# Patient Record
Sex: MALE | Race: WHITE | NOT HISPANIC OR LATINO | Employment: FULL TIME | URBAN - METROPOLITAN AREA
[De-identification: names, ages, dates, MRNs, and addresses within clinical notes are randomized per-mention and may not be internally consistent; named-entity substitution may affect disease eponyms.]

---

## 2021-06-18 ENCOUNTER — HISTORICAL (OUTPATIENT)
Dept: RADIOLOGY | Facility: HOSPITAL | Age: 46
End: 2021-06-18

## 2021-08-10 ENCOUNTER — HISTORICAL (OUTPATIENT)
Dept: FAMILY MEDICINE | Facility: CLINIC | Age: 46
End: 2021-08-10

## 2021-08-10 LAB
ABS NEUT (OLG): 5.8 X10(3)/MCL (ref 2.1–9.2)
ALBUMIN SERPL-MCNC: 3.4 GM/DL (ref 3.5–5)
ALBUMIN/GLOB SERPL: 0.8 RATIO (ref 1.1–2)
ALP SERPL-CCNC: 92 UNIT/L (ref 40–150)
ALT SERPL-CCNC: 59 UNIT/L (ref 0–55)
AST SERPL-CCNC: 29 UNIT/L (ref 5–34)
BASOPHILS # BLD AUTO: 0.1 X10(3)/MCL (ref 0–0.2)
BASOPHILS NFR BLD AUTO: 1 %
BILIRUB SERPL-MCNC: 0.4 MG/DL
BILIRUBIN DIRECT+TOT PNL SERPL-MCNC: 0.2 MG/DL (ref 0–0.5)
BILIRUBIN DIRECT+TOT PNL SERPL-MCNC: 0.2 MG/DL (ref 0–0.8)
BUN SERPL-MCNC: 10.4 MG/DL (ref 8.9–20.6)
CALCIUM SERPL-MCNC: 9.5 MG/DL (ref 8.4–10.2)
CHLORIDE SERPL-SCNC: 102 MMOL/L (ref 98–107)
CHOLEST SERPL-MCNC: 200 MG/DL
CHOLEST/HDLC SERPL: 7 {RATIO} (ref 0–5)
CO2 SERPL-SCNC: 27 MMOL/L (ref 22–29)
CREAT SERPL-MCNC: 1 MG/DL (ref 0.73–1.18)
EOSINOPHIL # BLD AUTO: 0.3 X10(3)/MCL (ref 0–0.9)
EOSINOPHIL NFR BLD AUTO: 3 %
ERYTHROCYTE [DISTWIDTH] IN BLOOD BY AUTOMATED COUNT: 12.5 % (ref 11.5–14.5)
EST. AVERAGE GLUCOSE BLD GHB EST-MCNC: 159.9 MG/DL
GLOBULIN SER-MCNC: 4.1 GM/DL (ref 2.4–3.5)
GLUCOSE SERPL-MCNC: 227 MG/DL (ref 74–100)
HBA1C MFR BLD: 7.2 %
HCT VFR BLD AUTO: 42.3 % (ref 40–51)
HDLC SERPL-MCNC: 30 MG/DL (ref 35–60)
HGB BLD-MCNC: 14 GM/DL (ref 13.5–17.5)
IMM GRANULOCYTES # BLD AUTO: 0.04 10*3/UL
IMM GRANULOCYTES NFR BLD AUTO: 0 %
LDLC SERPL CALC-MCNC: 121 MG/DL (ref 50–140)
LYMPHOCYTES # BLD AUTO: 2.9 X10(3)/MCL (ref 0.6–4.6)
LYMPHOCYTES NFR BLD AUTO: 30 %
MCH RBC QN AUTO: 29.7 PG (ref 26–34)
MCHC RBC AUTO-ENTMCNC: 33.1 GM/DL (ref 31–37)
MCV RBC AUTO: 89.6 FL (ref 80–100)
MONOCYTES # BLD AUTO: 0.6 X10(3)/MCL (ref 0.1–1.3)
MONOCYTES NFR BLD AUTO: 6 %
NEUTROPHILS # BLD AUTO: 5.8 X10(3)/MCL (ref 2.1–9.2)
NEUTROPHILS NFR BLD AUTO: 60 %
NRBC BLD AUTO-RTO: 0 % (ref 0–0.2)
PLATELET # BLD AUTO: 259 X10(3)/MCL (ref 130–400)
PMV BLD AUTO: 8.3 FL (ref 7.4–10.4)
POTASSIUM SERPL-SCNC: 4 MMOL/L (ref 3.5–5.1)
PROT SERPL-MCNC: 7.5 GM/DL (ref 6.4–8.3)
RBC # BLD AUTO: 4.72 X10(6)/MCL (ref 4.5–5.9)
SODIUM SERPL-SCNC: 138 MMOL/L (ref 136–145)
TRIGL SERPL-MCNC: 245 MG/DL (ref 34–140)
TSH SERPL-ACNC: 2.38 UIU/ML (ref 0.35–4.94)
VLDLC SERPL CALC-MCNC: 49 MG/DL
WBC # SPEC AUTO: 9.7 X10(3)/MCL (ref 4.5–11)

## 2021-09-15 ENCOUNTER — HISTORICAL (OUTPATIENT)
Dept: ADMINISTRATIVE | Facility: HOSPITAL | Age: 46
End: 2021-09-15

## 2021-09-15 LAB
CREAT UR-MCNC: 303.1 MG/DL (ref 58–161)
MICROALBUMIN UR-MCNC: 20 MG/L
MICROALBUMIN/CREAT RATIO PNL UR: 6.6 MG/GM CR (ref 0–30)

## 2021-09-26 LAB
LEFT EYE DM RETINOPATHY: NEGATIVE
RIGHT EYE DM RETINOPATHY: NEGATIVE

## 2021-11-17 ENCOUNTER — HISTORICAL (OUTPATIENT)
Dept: ADMINISTRATIVE | Facility: HOSPITAL | Age: 46
End: 2021-11-17

## 2021-11-17 LAB
ALBUMIN SERPL-MCNC: 3.7 GM/DL (ref 3.5–5)
ALBUMIN/GLOB SERPL: 0.9 RATIO (ref 1.1–2)
ALP SERPL-CCNC: 89 UNIT/L (ref 40–150)
ALT SERPL-CCNC: 49 UNIT/L (ref 0–55)
AST SERPL-CCNC: 31 UNIT/L (ref 5–34)
BILIRUB SERPL-MCNC: 0.5 MG/DL
BILIRUBIN DIRECT+TOT PNL SERPL-MCNC: 0.2 MG/DL (ref 0–0.5)
BILIRUBIN DIRECT+TOT PNL SERPL-MCNC: 0.3 MG/DL (ref 0–0.8)
BUN SERPL-MCNC: 11.3 MG/DL (ref 8.9–20.6)
CALCIUM SERPL-MCNC: 9.5 MG/DL (ref 8.7–10.5)
CHLORIDE SERPL-SCNC: 102 MMOL/L (ref 98–107)
CO2 SERPL-SCNC: 28 MMOL/L (ref 22–29)
CREAT SERPL-MCNC: 0.89 MG/DL (ref 0.73–1.18)
EST. AVERAGE GLUCOSE BLD GHB EST-MCNC: 137 MG/DL
GLOBULIN SER-MCNC: 4.2 GM/DL (ref 2.4–3.5)
GLUCOSE SERPL-MCNC: 120 MG/DL (ref 74–100)
HBA1C MFR BLD: 6.4 %
HCV AB SERPL QL IA: NONREACTIVE
HIV 1+2 AB+HIV1 P24 AG SERPL QL IA: NONREACTIVE
POTASSIUM SERPL-SCNC: 3.8 MMOL/L (ref 3.5–5.1)
PROT SERPL-MCNC: 7.9 GM/DL (ref 6.4–8.3)
SODIUM SERPL-SCNC: 137 MMOL/L (ref 136–145)

## 2022-01-10 ENCOUNTER — HISTORICAL (OUTPATIENT)
Dept: LAB | Facility: HOSPITAL | Age: 47
End: 2022-01-10

## 2022-01-10 LAB — SARS-COV-2 RNA RESP QL NAA+PROBE: DETECTED

## 2022-04-10 ENCOUNTER — HISTORICAL (OUTPATIENT)
Dept: ADMINISTRATIVE | Facility: HOSPITAL | Age: 47
End: 2022-04-10
Payer: COMMERCIAL

## 2022-04-25 VITALS
OXYGEN SATURATION: 99 % | BODY MASS INDEX: 42.66 KG/M2 | SYSTOLIC BLOOD PRESSURE: 128 MMHG | WEIGHT: 315 LBS | HEIGHT: 72 IN | DIASTOLIC BLOOD PRESSURE: 77 MMHG

## 2022-05-03 NOTE — HISTORICAL [facility] CERNER
This is a historical note converted from Antony. Formatting and pictures may have been removed.  Please reference Antony for original formatting and attached multimedia. Chief Complaint  F/U visit DM Type 2.  History of Present Illness  Ashish presents today for follow-up?after recent interventions?for his diabetes mellitus/?elevated BMI?and OA knees and?chronic low back pain.? At our last visit,?we started Metformin.? He also has made significant efforts at calorie counting?and increasing his physical activity.? He has lost 7 pounds since his last visit?and I congratulated him on this accomplishment.? He continues to be motivated?to change his?lifestyle habits.? He did eat?more calories than his allotment?one time when he went to eat at [employer].? Denies any side effects to Metformin.  He is due for urine microalbumin creatinine ratio and a?fundus photo.  ?  CONCEPCIÓN/serous otitis media/chronic nasal congestion:?He was referred to see Dr. Rodas?and was found to have a intranasal ulcer and was prescribed doxycycline. ?He is currently still taking the doxycycline.? They also recommended a repeat?sleep study?as it has been more than 5 years since his last evaluation.? He will try to complete the home sleep study this weekend.  OA bilateral knees/chronic low back pain:?He has not yet?pursued aquatic therapy?due to concerns re: COVID-19.? I encouraged him today?since the COVID-19 numbers?seem to be in decline, to investigate?the possibility of doing?aquatic therapy at?[facility] or?[facility].  Review of Systems  See HPI  Physical Exam  Vitals & Measurements  T:?36.6? ?C (Oral)? HR:?67(Peripheral)? RR:?18? BP:?115/79? SpO2:?94%?  HT:?182.00?cm? WT:?166.800?kg? BMI:?50.36?  General: Calm; cooperative; pleasant  PSYCH:?appropriate mood and affect  SKIN: Bilateral lower extremity postinflammatory hyperpigmentation due to prior?mosquito bites  RESP: speaking in complete sentences without obvious respiratory distress  NEURO:?alert  and oriented  DFE:?Documented in ad hoc?charting  Assessment/Plan  1.?Diabetes mellitus, type 2?E11.9  Diabetic foot exam completed  Urine microalbumin ordered  Referred for fundus photo  CMP, hemoglobin A1c ordered for next visit  Metformin refilled  Ordered:  Microalbum/Creatinine Ratio Urine (Microalb/Creat), Routine collect, Urine, 09/15/21 11:34:00 CDT, Stop date 09/15/21 11:34:00 CDT, Nurse collect, Diabetes mellitus, type 2  ?  2.?CONCEPCIÓN on CPAP?G47.33  ?Follow-up with Dr. Rodas per his direction  Ordered:  ?  3.?Osteoarthritis of both knees?M17.0  ?Encouraged to pursue?aquatic therapy  Ordered:  ?  4.?Morbid obesity?E66.01  ?Continued encouragement and efforts at?weight loss,?healthy eating?and exercise  Ordered:  ?  Orders:  metFORMIN, See Instructions, TAKE 1 TABLET BY MOUTH TWICE A DAY, # 60 tab(s), 1 Refill(s), Pharmacy: [facility]/pharmacy #[id number], 182, cm, Height/Length Dosing, 09/15/21 10:32:00 CDT, 166.8, kg, Weight Dosing, 09/15/21 10:32:00 CDT  Referrals  [facility] Internal Referral to [facility], Specialty: Ophthalmology, Start: 09/15/21 11:29:00 CDT, Service By: 12/31/21, Urgent, Exclusions: No  Clinic Follow up, *Est. 11/17/21 9:30:00 CST, Order for future visit, Diabetes mellitus, type 2, [facility]   Problem List/Past Medical History  Ongoing  Allergic rhinitis, unspecified  Anxiety  Diabetes mellitus, type 2  Morbid obesity  CONCEPCIÓN on CPAP  Osteoarthritis of both knees  Historical  Closed fracture of multiple right ribs  Closed fracture of right foot  Closed fracture of the radial shaft  Fractured nasal bones  Procedure/Surgical History  Closed reduction of fracture of radius and ulna  denies  Open reduction of fracture of metacarpal bone with internal fixation   Medications  cholecalciferol 5000 intl units (125 mcg) oral capsule, 5000 IntUnit= 1 cap(s), Oral, qAM  clonazePAM 0.5 mg oral tablet, 0.5 mg= 1 tab(s), Oral, TID, PRN  doxycycline hyclate 100 mg oral capsule, 100 mg= 1 cap(s),  Oral, BID  FLUoxetine 40 mg oral capsule, 40 mg= 1 cap(s), Oral, qAM  metformin 500 mg oral tablet, See Instructions, 1 refills  mometasone 50 mcg/inh nasal spray, 2 spray(s), Nasal, Daily, 2 refills  triamcinolone 0.1% topical ointment, TOP, TID  Zyrtec 10 mg oral tablet, 10 mg= 1 tab(s), Oral, Daily  Allergies  Adhesive Bandage?(Rash)  mixed grass pollens allergen extract?(Hives)  Social History  Abuse/Neglect  No, No, Yes, 09/15/2021  Alcohol  Past, 05/26/2021  Employment/School  Employed, 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 Living situation: Home/Independent. CPAP/BiPAP, 05/26/2021    Never in , 05/26/2021  Nutrition/Health  Regular, Good, 05/26/2021  Sexual  Sexually active: No. Sexual orientation: Straight or heterosexual. Gender Identity Identifies as male. No, 05/26/2021  Spiritual/Cultural  Congregation, No, 05/26/2021  Substance Use  Past, Marijuana, 05/26/2021  Tobacco  Never (less than 100 in lifetime), N/A, 09/15/2021  Immunizations  Vaccine Date Status   COVID-19, vector nr, rS-Ad26 - Virginie 03/05/2021 Given       Patient had an elevated ALT of 59 at last assessment.? We will also order acute hep panel?as hepatitis C screening is recommended for all?patients 18-79 and because of his ALT?we will also assess?hep B and?have a status

## 2022-05-03 NOTE — HISTORICAL [facility] CERNER
This is a historical note converted from Antony. Formatting and pictures may have been removed.  Please reference Antony for original formatting and attached multimedia. Chief Complaint  Routine clinic F/U. No c/o voiced.  History of Present Illness  Kaiser is a 45-year-old established patient with a history of diabetes mellitus type 2,?struct of sleep apnea on CPAP,?allergic rhinitis, serous otitis media?and elevated BMI who presents for follow-up of his chronic medical conditions.  ?  Diabetes mellitus type 2/elevated BMI:?Kaiser has adopted?healthier eating habits?and has been controlling his portion size and making better choices in regard to his meals.??He is compliant with his Metformin on most days but admits to missing a dose approximately once weekly.??He has not been able to exercise?or?improve his diet further since our last visit due to?some changes at work.??He remains motivated?and despite?a busy schedule has lost?a couple of?additional pounds.??Last hemoglobin A1c was 7.1%.??He is due for hemoglobin A1c today.  ?  Allergic rhinitis/serous otitis media:?He continues?to be followed by?Dr. Rodas?though his symptoms have improved.??He continues to use?mometasone nasal spray?and cetirizine.  ?  ?  CONCEPCIÓN:?He is compliant with his CPAP nightly.??He attempted to do a sleep study?to determine if he needed retitration?but he was unable to sleep sufficiently?to evaluate?the efficiency of his sleep.  ?  Multiple skin lesions:?He has a lesion on?his left middle finger,?his left?thigh and his left lateral leg.??Images?are?present and capture for review.  ?  ?  Elevated?ALT: ?We will repeat CMP today?and order hepatitis C?antibody screening  ?  Health maintenance:?We will order HIV?screening?in this patient without risk factors.  Review of Systems  See HPI above  Physical Exam  Vitals & Measurements  T:?36.8? ?C (Oral)? HR:?63(Peripheral)? RR:?18? BP:?128/77? SpO2:?99%?  HT:?182.00?cm? WT:?165.200?kg?  BMI:?49.87?  Alert oriented x4 no acute distress  CVS:?Regular rate and rhythm without gallops rubs  Diabetic foot exam:?Palpable dorsalis pedis?and posterior tibial pulses; sensation intact?throughout.??No deformity?or significant calluses noted.  Skin:?Multiple?papules on his scalp?likely due to recent?shaving of his head.??He also has a papule?near his temple which he?relates to the pressure from his CPAP mask.??  Assessment/Plan  1.?Diabetes mellitus, type 2?E11.9  ?CMP, hemoglobin A1c today.??PPSV23 today?due to history of diabetes.??He defers?Tdap and?declines influenza?due to the fact that he usually feels ill after taking?influenza vaccinations..??We will consider Tdap?at next visit or he may?receive it at his local pharmacy.??I also counseled him on?recommendation for COVID-19 booster?based on his comorbidities.??Todays hemoglobin A1c is 6.4% improved from?7.1%.  Ordered:  ?  2.?Morbid obesity?E66.01  ?Counseled regarding?continued efforts at portion control,?reduction in processed foods?and eating?more?whole foods.  Ordered:  ?  3.?Allergic rhinitis, unspecified?J30.9  ?Mometasone nasal spray/Zyrtec as needed  Ordered:  ?  4.?CONCEPCIÓN on CPAP?G47.33  ?Continued use of CPAP?nightly.  Ordered:  ?  Referrals  Clinic Follow up, *Est. 02/16/22 8:45:00 CST, Order for future visit, Diabetes mellitus, type 2, [facility]  5.??Elevated ALT:?Prior ALT was 59 todays repeat is 49?indicating improvement.   Problem List/Past Medical History  Ongoing  Allergic rhinitis, unspecified  Anxiety  Diabetes mellitus, type 2  Morbid obesity  CONCEPCIÓN on CPAP  Osteoarthritis of both knees  Historical  Closed fracture of multiple right ribs  Closed fracture of right foot  Closed fracture of the radial shaft  Fractured nasal bones  Procedure/Surgical History  Closed reduction of fracture of radius and ulna  denies  Open reduction of fracture of metacarpal bone with internal fixation   Medications  cholecalciferol 5000 intl units (125  mcg) oral capsule, 5000 IntUnit= 1 cap(s), Oral, qAM  clonazePAM 0.5 mg oral tablet, 0.5 mg= 1 tab(s), Oral, TID, PRN  FLUoxetine 40 mg oral capsule, 40 mg= 1 cap(s), Oral, qAM  metformin 500 mg oral tablet, See Instructions  mometasone 50 mcg/inh nasal spray, 2 spray(s), Nasal, Daily, 2 refills  triamcinolone 0.1% topical ointment, TOP, TID  Zyrtec 10 mg oral tablet, 10 mg= 1 tab(s), Oral, Daily  Allergies  Adhesive Bandage?(Rash)  mixed grass pollens allergen extract?(Hives)  Social History  Abuse/Neglect  No, No, Yes, 11/17/2021  Alcohol  Past, 05/26/2021  Employment/School  Employed, 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 Living situation: Home/Independent. CPAP/BiPAP, 05/26/2021    Never in , 05/26/2021  Nutrition/Health  Regular, Good, 05/26/2021  Sexual  Sexually active: No. Sexual orientation: Straight or heterosexual. Gender Identity Identifies as male. No, 05/26/2021  Spiritual/Cultural  [employer], No, 05/26/2021  Substance Use  Past, Marijuana, 05/26/2021  Tobacco  Never (less than 100 in lifetime), N/A, 11/17/2021  Immunizations  Vaccine Date Status   pneumococcal 23-polyvalent vaccine 11/17/2021 Given   COVID-19, vector nr, rS-Ad26 - Virginie 03/05/2021 Given   Lab Results  Hemoglobin A1c 6.4%?ALT 49?both improved

## 2022-05-04 PROBLEM — E11.9 TYPE 2 DIABETES MELLITUS: Status: ACTIVE | Noted: 2022-05-04

## 2022-05-04 PROBLEM — E66.01 MORBID OBESITY: Status: ACTIVE | Noted: 2022-05-04

## 2022-05-04 PROBLEM — F41.1 GENERALIZED ANXIETY DISORDER: Status: ACTIVE | Noted: 2022-05-04

## 2022-05-04 PROBLEM — J30.9 ALLERGIC RHINITIS: Status: ACTIVE | Noted: 2022-05-04

## 2022-05-04 PROBLEM — G47.33 OBSTRUCTIVE SLEEP APNEA SYNDROME: Status: ACTIVE | Noted: 2022-05-04

## 2022-05-04 PROBLEM — M17.0 OSTEOARTHRITIS OF BOTH KNEES: Status: ACTIVE | Noted: 2022-05-04

## 2022-05-04 RX ORDER — TRIAMCINOLONE ACETONIDE 1 MG/G
1 OINTMENT TOPICAL 3 TIMES DAILY PRN
COMMUNITY
Start: 2021-08-16

## 2022-05-04 RX ORDER — METFORMIN HYDROCHLORIDE 500 MG/1
500 TABLET ORAL 2 TIMES DAILY WITH MEALS
Qty: 180 TABLET | Refills: 3 | Status: SHIPPED | OUTPATIENT
Start: 2022-05-04 | End: 2022-06-13 | Stop reason: SDUPTHER

## 2022-05-04 RX ORDER — CLONAZEPAM 0.5 MG/1
0.5 TABLET ORAL 3 TIMES DAILY PRN
COMMUNITY
Start: 2021-05-26

## 2022-05-04 RX ORDER — FLUOXETINE HYDROCHLORIDE 40 MG/1
40 CAPSULE ORAL EVERY MORNING
COMMUNITY
Start: 2022-01-03 | End: 2022-08-17 | Stop reason: ALTCHOICE

## 2022-05-04 RX ORDER — CHOLECALCIFEROL (VITAMIN D3) 125 MCG
5000 CAPSULE ORAL EVERY MORNING
COMMUNITY
Start: 2022-01-03 | End: 2022-09-21 | Stop reason: SDUPTHER

## 2022-05-04 RX ORDER — METFORMIN HYDROCHLORIDE 500 MG/1
500 TABLET ORAL 2 TIMES DAILY
COMMUNITY
Start: 2021-11-22 | End: 2022-05-04 | Stop reason: SDUPTHER

## 2022-05-18 ENCOUNTER — OFFICE VISIT (OUTPATIENT)
Dept: FAMILY MEDICINE | Facility: CLINIC | Age: 47
End: 2022-05-18
Payer: COMMERCIAL

## 2022-05-18 VITALS
BODY MASS INDEX: 42.66 KG/M2 | HEART RATE: 82 BPM | OXYGEN SATURATION: 95 % | TEMPERATURE: 99 F | SYSTOLIC BLOOD PRESSURE: 124 MMHG | WEIGHT: 315 LBS | HEIGHT: 72 IN | DIASTOLIC BLOOD PRESSURE: 84 MMHG

## 2022-05-18 VITALS
BODY MASS INDEX: 42.66 KG/M2 | OXYGEN SATURATION: 97 % | WEIGHT: 315 LBS | SYSTOLIC BLOOD PRESSURE: 121 MMHG | TEMPERATURE: 98 F | HEART RATE: 60 BPM | RESPIRATION RATE: 18 BRPM | DIASTOLIC BLOOD PRESSURE: 79 MMHG | HEIGHT: 72 IN

## 2022-05-18 DIAGNOSIS — E11.9 TYPE 2 DIABETES MELLITUS WITHOUT COMPLICATION, WITHOUT LONG-TERM CURRENT USE OF INSULIN: Primary | ICD-10-CM

## 2022-05-18 DIAGNOSIS — D23.9 DERMATOFIBROMA: Primary | ICD-10-CM

## 2022-05-18 PROCEDURE — 99213 OFFICE O/P EST LOW 20 MIN: CPT | Mod: PBBFAC | Performed by: FAMILY MEDICINE

## 2022-05-18 PROCEDURE — 17110 DESTRUCTION B9 LES UP TO 14: CPT | Mod: PBBFAC | Performed by: STUDENT IN AN ORGANIZED HEALTH CARE EDUCATION/TRAINING PROGRAM

## 2022-05-18 PROCEDURE — 99214 OFFICE O/P EST MOD 30 MIN: CPT | Mod: PBBFAC,27 | Performed by: FAMILY MEDICINE

## 2022-05-18 NOTE — PROGRESS NOTES
Chief Complaint  Follow-up (Growth to left ring finger x 1 year)      History of Present Illness    47 yo M returns to minor surgery for cryotherapy of fourth finger of left hand.      Lesion on his finger that was treated with cryo did improve. A surface of the lesion came off and has a smoother surface. No problems with cryo. Pt would like to keep treating it with cryo at this time.  He also has history of dermatofibroma of left thigh confirmed by pathology after punch biopsy.     Final Diagnosis (Verified)    Skin, left thigh, punch biopsy:  - Dermatofibroma. See comment    Review of Systems  ROS    Physical Exam  Physical Exam    Vitals:    05/18/22 0844   BP: 124/84   Pulse: 82   Temp: 99.1 °F (37.3 °C)     Wt Readings from Last 2 Encounters:   05/18/22 (!) 164.6 kg (362 lb 14 oz)   11/17/21 (!) 165.2 kg (364 lb 3.2 oz)     Gen: alert, no acute distress  Skin: left thigh- incision clean, dry,and healed.  No surrounding erythema, warmth, or drainage. Left fourth finger with raised area noted - not as raised as previous visit but lateral aspect of lesion still mildly raised.   Psych: cooperative, appropriate mood and affect    Procedure Note:  Procedure: cryosurgery for dermatofibroma  Performed by: Lalitha Freed MD;  Sudheer Stout DO  Consent: signed consent obtained after discussion of risks, benefits, and alternative treatments  Description: Liquid nitrogen used for cryotherapy. Tip held 1 cm from lesion and sprayed in freeze-thaw cycle.   The patient tolerated the procedure well with no complications.           Assessment / Plan:    Dermatofibroma  -Cryotherapy to fourth finger  -Patient tolerated procedure well  -Post care instructions and return precautions discussed.        Follow up:    -Follow up PRN

## 2022-05-18 NOTE — PROGRESS NOTES
Subjective:       Patient ID: Kaiser Herron Jr. is a 46 y.o. male.    Chief Complaint: Diabetes    HPI   46-year-old established patient with history of diabetes mellitus, elevated BMI, CONCEPCIÓN on CPAP, severe allergic rhinitis and tinnitus presents for follow-up of his chronic medical conditions. He also had COVID-19 in January and has no residual symptoms.       Diabetes mellitus: Well-controlled at last assessment with A1c of 6.4% (11/17/2021) on Metformin. Patient is working on a healthier diet and consuming between 1500 to 1800 lizbeth daily and increasing his physical activity.  Elevated BMI: Maximum BMI was 51.5 on June 28, 2021. With alteration of diet and increased activity current BMI is 49.71. Overall 13 pound reduction in weight.  We previously  discussed using a GLP-1 to assist in his weight reduction but will continue defer until his labs are reassessed.   Tinnitus/severe allergic rhinitis: Patient was seen by ENT and scheduled for MRI to evaluate a for 8th nerve in Jan. MRI was canceled due to his COVID-19 infection. He continues to have tinnitus and will be rescheduling MRI. Patient will have it done later this year after he has met his deductible.   CONCEPCIÓN: Compliant with  CPAP and wakes feeling well rested.   Review of Systems   Constitutional: Negative for activity change, chills, diaphoresis and fever.   HENT: Positive for nasal congestion (intermittent ) and tinnitus.    Respiratory: Negative for cough and wheezing.    Endocrine: Negative for polydipsia, polyphagia and polyuria.            Objective:      Vitals:    05/18/22 1116   BP: 121/79   Pulse: 60   Resp: 18   Temp: 98.4 °F (36.9 °C)       Physical Exam  Constitutional:       General: He is not in acute distress.     Appearance: He is obese.   Cardiovascular:      Rate and Rhythm: Normal rate and regular rhythm.      Heart sounds: No murmur heard.    No friction rub. No gallop.   Pulmonary:      Effort: Pulmonary effort is normal. No respiratory  distress.      Breath sounds: Normal breath sounds. No wheezing.   Neurological:      Mental Status: He is alert.           Assessment/Plan:  Type 2 diabetes mellitus without complication, without long-term current use of insulin  -     Comprehensive Metabolic Panel  -     Hemoglobin A1C  -     Lipid Panel  -     Microalbumin/Creatinine Ratio, Urine  -     TSH    We contacted [facility] to find out why metformin was not filled. They are investigating. Patient will do labs next week      Follow up in about 3 months (around 8/18/2022).

## 2022-06-13 ENCOUNTER — TELEPHONE (OUTPATIENT)
Dept: FAMILY MEDICINE | Facility: CLINIC | Age: 47
End: 2022-06-13
Payer: COMMERCIAL

## 2022-06-13 ENCOUNTER — APPOINTMENT (OUTPATIENT)
Dept: LAB | Facility: HOSPITAL | Age: 47
End: 2022-06-13
Attending: FAMILY MEDICINE
Payer: COMMERCIAL

## 2022-06-13 DIAGNOSIS — E11.9 TYPE 2 DIABETES MELLITUS WITHOUT COMPLICATION, WITHOUT LONG-TERM CURRENT USE OF INSULIN: Primary | ICD-10-CM

## 2022-06-13 DIAGNOSIS — E66.01 MORBID OBESITY: ICD-10-CM

## 2022-06-13 LAB
ALBUMIN SERPL-MCNC: 3.5 GM/DL (ref 3.5–5)
ALBUMIN/GLOB SERPL: 0.9 RATIO (ref 1.1–2)
ALP SERPL-CCNC: 87 UNIT/L (ref 40–150)
ALT SERPL-CCNC: 62 UNIT/L (ref 0–55)
AST SERPL-CCNC: 32 UNIT/L (ref 5–34)
BILIRUBIN DIRECT+TOT PNL SERPL-MCNC: 0.3 MG/DL
BUN SERPL-MCNC: 11.7 MG/DL (ref 8.9–20.6)
CALCIUM SERPL-MCNC: 9.7 MG/DL (ref 8.4–10.2)
CHLORIDE SERPL-SCNC: 102 MMOL/L (ref 98–107)
CHOLEST SERPL-MCNC: 200 MG/DL
CHOLEST/HDLC SERPL: 5 {RATIO} (ref 0–5)
CO2 SERPL-SCNC: 27 MMOL/L (ref 22–29)
CREAT SERPL-MCNC: 0.96 MG/DL (ref 0.73–1.18)
CREAT UR-MCNC: 137.5 MG/DL (ref 63–166)
EST. AVERAGE GLUCOSE BLD GHB EST-MCNC: 165.7 MG/DL
GLOBULIN SER-MCNC: 4.1 GM/DL (ref 2.4–3.5)
GLUCOSE SERPL-MCNC: 173 MG/DL (ref 74–100)
HBA1C MFR BLD: 7.4 %
HDLC SERPL-MCNC: 37 MG/DL (ref 35–60)
LDLC SERPL CALC-MCNC: 141 MG/DL (ref 50–140)
MICROALBUMIN UR-MCNC: 18 UG/ML
MICROALBUMIN/CREAT RATIO PNL UR: 13.1 MG/GM CR (ref 0–30)
POTASSIUM SERPL-SCNC: 4.2 MMOL/L (ref 3.5–5.1)
PROT SERPL-MCNC: 7.6 GM/DL (ref 6.4–8.3)
SODIUM SERPL-SCNC: 138 MMOL/L (ref 136–145)
TRIGL SERPL-MCNC: 112 MG/DL (ref 34–140)
TSH SERPL-ACNC: 2.74 UIU/ML (ref 0.35–4.94)
VLDLC SERPL CALC-MCNC: 22 MG/DL

## 2022-06-13 PROCEDURE — 80053 COMPREHEN METABOLIC PANEL: CPT | Performed by: FAMILY MEDICINE

## 2022-06-13 PROCEDURE — 36415 COLL VENOUS BLD VENIPUNCTURE: CPT | Performed by: FAMILY MEDICINE

## 2022-06-13 PROCEDURE — 83036 HEMOGLOBIN GLYCOSYLATED A1C: CPT | Performed by: FAMILY MEDICINE

## 2022-06-13 PROCEDURE — 80061 LIPID PANEL: CPT | Performed by: FAMILY MEDICINE

## 2022-06-13 PROCEDURE — 84443 ASSAY THYROID STIM HORMONE: CPT | Performed by: FAMILY MEDICINE

## 2022-06-13 PROCEDURE — 82043 UR ALBUMIN QUANTITATIVE: CPT | Performed by: FAMILY MEDICINE

## 2022-06-13 RX ORDER — METFORMIN HYDROCHLORIDE 500 MG/1
1000 TABLET ORAL 2 TIMES DAILY WITH MEALS
Qty: 360 TABLET | Refills: 3 | Status: SHIPPED | OUTPATIENT
Start: 2022-06-13 | End: 2023-04-26

## 2022-06-13 RX ORDER — DULAGLUTIDE 0.75 MG/.5ML
0.75 INJECTION, SOLUTION SUBCUTANEOUS
Qty: 4 PEN | Refills: 2 | Status: SHIPPED | OUTPATIENT
Start: 2022-06-13 | End: 2022-08-17

## 2022-06-13 NOTE — TELEPHONE ENCOUNTER
Hemoglobin A1c 7.4 (6.4 previously)  Estimated average glucose 165.7  Total cholesterol 200  HDL 37 (30)  Triglycerides 112 (245)   (121)  Glucose 173 (120)  Microalbumin creatinine ratio 13.1 (6.6)  ALT 62 (49, 59)    Above results reviewed in great detail with patient.  He reports he had difficulty getting his metformin fill because he did not pick it up when it was initially filled and then they reach out it but marked has picked up.  This created gap in his care where he did not get his metformin for at least 4-5 weeks.  He has been resumed it approximately 1 week ago.  Given his increased A1c at 7.4%, BMI 49.71 and elevated LDL, he has characteristics of metabolic syndrome.  I recommended he increase the metformin to 1000 mg twice daily and a new prescription was sent to the pharmacy today.  I would also like to add Trulicity 0.75 weekly for glucose control and weight loss.

## 2022-07-12 ENCOUNTER — TELEPHONE (OUTPATIENT)
Dept: FAMILY MEDICINE | Facility: CLINIC | Age: 47
End: 2022-07-12
Payer: COMMERCIAL

## 2022-07-12 DIAGNOSIS — E11.9 TYPE 2 DIABETES MELLITUS WITHOUT COMPLICATION, WITHOUT LONG-TERM CURRENT USE OF INSULIN: Primary | ICD-10-CM

## 2022-07-12 DIAGNOSIS — E78.2 HYPERLIPIDEMIA, MIXED: ICD-10-CM

## 2022-07-12 RX ORDER — ROSUVASTATIN CALCIUM 20 MG/1
20 TABLET, COATED ORAL DAILY
Qty: 90 TABLET | Refills: 3 | Status: SHIPPED | OUTPATIENT
Start: 2022-07-12 | End: 2022-08-17 | Stop reason: SDUPTHER

## 2022-07-12 NOTE — TELEPHONE ENCOUNTER
I received notice from [facility] referencing Statin Therapy   Total Chol 200  HDL 37  TRIG 112     Will begin statin therapy   Moderate intensity statin therapy based on age/ DM   ACC/AHA 10 yr risk 4.6%   Patient notified of new script

## 2022-08-02 ENCOUNTER — PATIENT OUTREACH (OUTPATIENT)
Dept: FAMILY MEDICINE | Facility: CLINIC | Age: 47
End: 2022-08-02
Payer: COMMERCIAL

## 2022-08-17 ENCOUNTER — OFFICE VISIT (OUTPATIENT)
Dept: FAMILY MEDICINE | Facility: CLINIC | Age: 47
End: 2022-08-17
Payer: COMMERCIAL

## 2022-08-17 VITALS
DIASTOLIC BLOOD PRESSURE: 78 MMHG | HEIGHT: 71 IN | SYSTOLIC BLOOD PRESSURE: 117 MMHG | OXYGEN SATURATION: 96 % | RESPIRATION RATE: 18 BRPM | WEIGHT: 315 LBS | HEART RATE: 74 BPM | BODY MASS INDEX: 44.1 KG/M2 | TEMPERATURE: 98 F

## 2022-08-17 DIAGNOSIS — E66.01 MORBID OBESITY: ICD-10-CM

## 2022-08-17 DIAGNOSIS — E78.2 HYPERLIPIDEMIA, MIXED: ICD-10-CM

## 2022-08-17 DIAGNOSIS — E11.9 TYPE 2 DIABETES MELLITUS WITHOUT COMPLICATION, WITHOUT LONG-TERM CURRENT USE OF INSULIN: Primary | ICD-10-CM

## 2022-08-17 DIAGNOSIS — Z23 NEED FOR TD VACCINE: ICD-10-CM

## 2022-08-17 PROCEDURE — 99214 OFFICE O/P EST MOD 30 MIN: CPT | Mod: PBBFAC | Performed by: FAMILY MEDICINE

## 2022-08-17 RX ORDER — ROSUVASTATIN CALCIUM 20 MG/1
20 TABLET, COATED ORAL DAILY
Qty: 90 TABLET | Refills: 3 | Status: SHIPPED | OUTPATIENT
Start: 2022-08-17 | End: 2023-04-26 | Stop reason: SDUPTHER

## 2022-08-17 RX ORDER — DULOXETIN HYDROCHLORIDE 60 MG/1
60 CAPSULE, DELAYED RELEASE ORAL DAILY
COMMUNITY
End: 2024-02-28 | Stop reason: SDUPTHER

## 2022-08-17 NOTE — LETTER
August 17, 2022      [facility] [address] [facility] [address]  Phone: [phone]       Patient: Kaiser Herron   YOB: 1975  Date of Visit: 08/17/2022    To Whom It May Concern:    Best Herron  was at [facility] on 08/17/2022. The patient may return to work on 08/17/2022 without restrictions. If you have any questions or concerns, or if I can be of further assistance, please do not hesitate to contact me.    Sincerely,    Bailee Saab LPN

## 2022-08-17 NOTE — PROGRESS NOTES
"  Subjective:       Patient ID: Kaiser Herron Jr. is a 46 y.o. male.    Chief Complaint: Follow-up and Diabetes (3 mo f/u diabetes)    HPI  47 y/o with history of DMII, hyperlipidemia Class III obesity presents for follow up      Diabetes type 2 uncontrolled:  Recent A1c 7.4% June 13, 2022  Started on Trulicity 0.75 weekly/compliant with metformin 1000 b.i.d.  Weight has increased slightly  Diet and exercise inconsistent  Elevated LDL:  Based on ACC/aha risk calculation and history of diabetes recommendation for moderate intensity statin  Health maintenance:  Needs Tdap  Review of Systems   Constitutional: Positive for fatigue.   Cardiovascular: Negative for chest pain.   Endocrine: Positive for polydipsia. Negative for polyphagia and polyuria.   Integumentary:  Negative for pallor.   Neurological: Negative for dizziness, tremors, seizures, speech difficulty, weakness and headaches.   Psychiatric/Behavioral: Negative for confusion. The patient is not nervous/anxious.             Objective:      Vitals:    08/17/22 1024   BP: 117/78   Pulse: 74   Resp: 18   Temp: 98.2 °F (36.8 °C)   /78 (BP Location: Right arm, Patient Position: Sitting, BP Method: X-Large (Automatic))   Pulse 74   Temp 98.2 °F (36.8 °C) (Oral)   Resp 18   Ht 5' 11" (1.803 m)   Wt (!) 164.1 kg (361 lb 12.8 oz)   SpO2 96%   BMI 50.46 kg/m²       Physical Exam  Constitutional:       General: He is not in acute distress.     Appearance: He is obese. He is not toxic-appearing.   Cardiovascular:      Rate and Rhythm: Normal rate and regular rhythm.      Pulses: Normal pulses.      Heart sounds: Normal heart sounds.     No friction rub. No gallop.   Pulmonary:      Effort: Pulmonary effort is normal. No respiratory distress.      Breath sounds: Normal breath sounds. No wheezing or rales.   Musculoskeletal:      Right lower leg: No edema.      Left lower leg: No edema.   Neurological:      Mental Status: He is alert.   Psychiatric:         " Mood and Affect: Mood normal.         Thought Content: Thought content normal.         Judgment: Judgment normal.          Latest Reference Range & Units 06/13/22 09:27   Sodium 136 - 145 mmol/L 138   Potassium 3.5 - 5.1 mmol/L 4.2   Chloride 98 - 107 mmol/L 102   CO2 22 - 29 mmol/L 27   BUN 8.9 - 20.6 mg/dL 11.7   Creatinine 0.73 - 1.18 mg/dL 0.96   eGFR if non African American mls/min/1.73/m2 >60   Glucose 74 - 100 mg/dL 173 (H)   Calcium 8.4 - 10.2 mg/dL 9.7   Alkaline Phosphatase 40 - 150 unit/L 87   PROTEIN TOTAL 6.4 - 8.3 gm/dL 7.6   Albumin 3.5 - 5.0 gm/dL 3.5   Albumin/Globulin Ratio 1.1 - 2.0 ratio 0.9 (L)   BILIRUBIN TOTAL <=1.5 mg/dL 0.3   AST 5 - 34 unit/L 32   ALT 0 - 55 unit/L 62 (H)   Globulin, Total 2.4 - 3.5 gm/dL 4.1 (H)   Cholesterol <=200 mg/dL 200   HDL 35 - 60 mg/dL 37   LDL Cholesterol External 50.00 - 140.00 mg/dL 141.00 (H)   Total Cholesterol/HDL Ratio 0 - 5  5   Triglycerides 34 - 140 mg/dL 112   Very Low Density Lipoprotein  22   Thyroid Stimulating Hormone 0.3500 - 4.9400 uIU/mL 2.7366   Hemoglobin A1C External <=7.0 % 7.4 (H)   Estimated Avg Glucose mg/dL 165.7   Creatinine, Urine 63.0 - 166.0 mg/dL 137.5   Microalbumin, Urine <=30.0 ug/ml 18.0   MICROALB/CREAT RATIO 0.0 - 30.0 mg/gm Cr 13.1       Assessment/Plan:  Type 2 diabetes mellitus without complication, without long-term current use of insulin  -     increase dulaglutide (TRULICITY) 1.5 mg/0.5 mL pen injector; Inject 1.5 mg into the skin every 7 days.  Dispense: 4 pen; Refill: 2  -    Comprehensive Metabolic Panel, Hemoglobin A1C before next visit  -   due to increased cardiovascular risk, start rosuvastatin (CRESTOR) 20 MG tablet; Take 1 tablet (20 mg total) by mouth once daily.  Dispense: 90 tablet; Refill: 3    Morbid obesity  -     increase dulaglutide (TRULICITY) 1.5 mg/0.5 mL p as above   Hyperlipidemia, mixed  -     rosuvastatin (CRESTOR) 20 MG tablet; Take 1 tablet (20 mg total) by mouth once daily.  Dispense: 90 tablet;  Refill: 3    Need for Td vaccine  -     diptheria-tetanus toxoids 2-2 Lf unit/0.5 mL injection 0.5 mL     Follow up in about 4 weeks (around 9/14/2022).   Needs eye exam/foot exam at next visit

## 2022-09-06 DIAGNOSIS — J01.91 ACUTE RECURRENT SINUSITIS, UNSPECIFIED LOCATION: Primary | ICD-10-CM

## 2022-09-06 RX ORDER — AMOXICILLIN 875 MG/1
875 TABLET, FILM COATED ORAL EVERY 12 HOURS
Qty: 20 TABLET | Refills: 0 | Status: SHIPPED | OUTPATIENT
Start: 2022-09-06 | End: 2022-12-21 | Stop reason: ALTCHOICE

## 2022-09-06 NOTE — PROGRESS NOTES
Sinuses stuffed up, throat scratchy, sinus pressure . Blowing yellow, slightly bloody discharge. Symptoms began Saturday and patient has a history of recurrent sinusitis . He is requesting a prescription for antibiotic.  I explained that this could be viral upper respiratory infection and we usually wait 10 before treating such infections with antibiotics.  Due to the recurrent nature of his sinusitis and his history, I will prescribe amoxicillin and if his symptoms do not resolve he may begin it.  He also reported that his son was sick last week with similar symptoms.

## 2022-09-21 ENCOUNTER — CLINICAL SUPPORT (OUTPATIENT)
Dept: FAMILY MEDICINE | Facility: CLINIC | Age: 47
End: 2022-09-21
Attending: FAMILY MEDICINE
Payer: COMMERCIAL

## 2022-09-21 ENCOUNTER — OFFICE VISIT (OUTPATIENT)
Dept: FAMILY MEDICINE | Facility: CLINIC | Age: 47
End: 2022-09-21
Payer: COMMERCIAL

## 2022-09-21 ENCOUNTER — CLINICAL SUPPORT (OUTPATIENT)
Dept: FAMILY MEDICINE | Facility: CLINIC | Age: 47
End: 2022-09-21
Payer: COMMERCIAL

## 2022-09-21 VITALS
TEMPERATURE: 98 F | SYSTOLIC BLOOD PRESSURE: 109 MMHG | RESPIRATION RATE: 18 BRPM | OXYGEN SATURATION: 97 % | DIASTOLIC BLOOD PRESSURE: 74 MMHG | WEIGHT: 315 LBS | HEART RATE: 79 BPM | BODY MASS INDEX: 44.1 KG/M2 | HEIGHT: 71 IN

## 2022-09-21 DIAGNOSIS — E11.9 TYPE 2 DIABETES MELLITUS WITHOUT COMPLICATION, WITHOUT LONG-TERM CURRENT USE OF INSULIN: Primary | ICD-10-CM

## 2022-09-21 DIAGNOSIS — E11.9 TYPE 2 DIABETES MELLITUS WITHOUT COMPLICATION, WITHOUT LONG-TERM CURRENT USE OF INSULIN: ICD-10-CM

## 2022-09-21 DIAGNOSIS — E55.9 VITAMIN D DEFICIENCY: ICD-10-CM

## 2022-09-21 LAB
ALBUMIN SERPL-MCNC: 3.8 GM/DL (ref 3.5–5)
ALBUMIN/GLOB SERPL: 0.9 RATIO (ref 1.1–2)
ALP SERPL-CCNC: 84 UNIT/L (ref 40–150)
ALT SERPL-CCNC: 48 UNIT/L (ref 0–55)
AST SERPL-CCNC: 30 UNIT/L (ref 5–34)
BILIRUBIN DIRECT+TOT PNL SERPL-MCNC: 0.6 MG/DL
BUN SERPL-MCNC: 15.1 MG/DL (ref 8.9–20.6)
CALCIUM SERPL-MCNC: 9.6 MG/DL (ref 8.4–10.2)
CHLORIDE SERPL-SCNC: 100 MMOL/L (ref 98–107)
CO2 SERPL-SCNC: 27 MMOL/L (ref 22–29)
CREAT SERPL-MCNC: 0.89 MG/DL (ref 0.73–1.18)
EST. AVERAGE GLUCOSE BLD GHB EST-MCNC: 137 MG/DL
GFR SERPLBLD CREATININE-BSD FMLA CKD-EPI: >60 MLS/MIN/1.73/M2
GLOBULIN SER-MCNC: 4.4 GM/DL (ref 2.4–3.5)
GLUCOSE SERPL-MCNC: 138 MG/DL (ref 74–100)
HBA1C MFR BLD: 6.4 %
POTASSIUM SERPL-SCNC: 4 MMOL/L (ref 3.5–5.1)
PROT SERPL-MCNC: 8.2 GM/DL (ref 6.4–8.3)
SODIUM SERPL-SCNC: 137 MMOL/L (ref 136–145)

## 2022-09-21 PROCEDURE — 99214 OFFICE O/P EST MOD 30 MIN: CPT | Mod: PBBFAC | Performed by: FAMILY MEDICINE

## 2022-09-21 PROCEDURE — 83036 HEMOGLOBIN GLYCOSYLATED A1C: CPT | Performed by: FAMILY MEDICINE

## 2022-09-21 PROCEDURE — 80053 COMPREHEN METABOLIC PANEL: CPT | Performed by: FAMILY MEDICINE

## 2022-09-21 PROCEDURE — 36415 COLL VENOUS BLD VENIPUNCTURE: CPT | Performed by: FAMILY MEDICINE

## 2022-09-21 RX ORDER — CHOLECALCIFEROL (VITAMIN D3) 125 MCG
5000 CAPSULE ORAL EVERY MORNING
Qty: 90 CAPSULE | Refills: 3 | Status: SHIPPED | OUTPATIENT
Start: 2022-09-21

## 2022-09-21 NOTE — PROGRESS NOTES
CMP within normal limits with the exception of glucose of 138 and globulin of 4.4.  ALT improved from last assessment to 48 (62 June 13, 2022).  Hemoglobin A1c improved from 7.4% to 6.4% with the addition of Trulicity.  Results reviewed in detail with patient.  May increase Trulicity at next visit for weight loss benefit.  Will repeat CMP at next visit to assess LFTs and globulin.          Subjective:       Patient ID: Kaiser Herron Jr. is a 46 y.o. male.    Chief Complaint: Follow-up and Diabetes  46-year-old established patient with history of diabetes mellitus, elevated BMI, CONCEPCIÓN on CPAP, severe allergic rhinitis and tinnitus presents for follow-up of his chronic medical conditions. He also had COVID-19 in January and has no residual symptoms.        Diabetes mellitus Type 2:   Uncontrolled at last assessment -A1c  7.4% 6/13/22  on metformin only (6.4% -11/17/2021)  Current Meds:  Metformin, Trulicity added 6/22 titrated to current dose of of 1.5 mg weekly                             Adherent to current treatment plan   Diet/Exercise: Continues working healthier diet;  goal of 1500 to 1800 lizbeth daily/increased physical activity.Meal planning includes avoidance of concentrated sweets and calorie counting. Rarely participates in exercise  Elevated BMI: Maximum BMI was 51.5 on June 28, 2021;BMI is 49.71 in June. Current BMI 50.35. Overall 12 pound reduction in weight.    MedicAlert identification: None  Hypoglycemia : No episodes; no confusion, dizziness, headaches, hunger, mood changes, nervousness/anxiousness, pallor, seizures, sleepiness, speech difficulty, sweats or tremors.   no blackouts, no hospitalization, no nocturnal hypoglycemia, no required assistance and no required glucagon injection  DM ROS: + polydipsia; pertinent negatives for diabetes include no blurred vision, no chest pain, no fatigue, no foot paresthesias, no foot ulcerations, no polyphagia, no polyuria, no visual change, no weakness and no  weight loss.   Diabetic comobidities/complications: no autonomic neuropathy, CVA, heart disease, impotence, nephropathy, peripheral neuropathy, PVD or retinopathy.   Risk factors for coronary artery disease : obesity, sedentary lifestyle, DM male sex. compliant with treatment most of the time. He has not had a previous visit with a dietitian. . He monitors blood glucose at home 1-2 x per week. He monitors urine at home 1-2 x per day. Blood glucose monitoring compliance is good. There is no change in his home blood glucose trend. He does not see a podiatrist.Eye exam is current.   Tinnitus/severe allergic rhinitis:   Seen by ENT -deferred MRI to evaluate a for 8th nerve because he had not met deductible Continues to have tinnitus and will be rescheduling MRI.   CONCEPCIÓN: Compliant with  CPAP and wakes feeling well rested.       Review of Systems   Constitutional:  Negative for fatigue.   Cardiovascular:  Negative for chest pain.   Endocrine: Positive for polydipsia (Reports dry mouth since sinus infection but it has recently improved). Negative for polyphagia and polyuria.   Integumentary:  Negative for pallor.   Neurological:  Negative for dizziness, tremors, seizures, speech difficulty, weakness and headaches.   Psychiatric/Behavioral:  Negative for confusion. The patient is not nervous/anxious.           Objective:      Vitals:    09/21/22 1006   BP: 109/74   Pulse: 79   Resp: 18   Temp: 98.2 °F (36.8 °C)       Physical Exam  Constitutional:       General: He is not in acute distress.     Appearance: He is obese. He is not ill-appearing, toxic-appearing or diaphoretic.   Cardiovascular:      Rate and Rhythm: Normal rate and regular rhythm.      Pulses:           Dorsalis pedis pulses are 2+ on the right side and 2+ on the left side.        Posterior tibial pulses are 2+ on the right side and 2+ on the left side.      Heart sounds:     No friction rub. No gallop.   Pulmonary:      Effort: Pulmonary effort is normal. No  respiratory distress.      Breath sounds: Normal breath sounds. No stridor. No wheezing, rhonchi or rales.   Musculoskeletal:      Right foot: Normal range of motion. No deformity, bunion, Charcot foot, foot drop or prominent metatarsal heads.      Left foot: Normal range of motion. No deformity, bunion, Charcot foot, foot drop or prominent metatarsal heads.   Feet:      Right foot:      Protective Sensation: 10 sites tested.  10 sites sensed.      Skin integrity: Skin integrity normal.      Toenail Condition: Right toenails are normal.      Left foot:      Protective Sensation: 10 sites tested.  10 sites sensed.      Skin integrity: Skin integrity normal.      Toenail Condition: Left toenails are normal.   Neurological:      Mental Status: He is alert.         Assessment/Plan:  Vitamin D deficiency  -     cholecalciferol, vitamin D3, 125 mcg (5,000 unit) capsule; Take 1 capsule (5,000 Units total) by mouth every morning.  Dispense: 90 capsule; Refill: 3    Type 2 diabetes mellitus without complication, without long-term current use of insulin  -     Hemoglobin A1C  -     Comprehensive Metabolic Panel  -     Diabetic Eye Screening Photo  -     DFE completed today  -            Follow up in about 3 months (around 12/21/2022).

## 2022-09-21 NOTE — PROGRESS NOTES
Kaiser Herron  is a 46 y.o. male here for a diabetic eye screening with non-dilated fundus photos per Dr. Ni.    Patient cooperative?: Yes  Small pupils?: No  Last eye exam: Unknown    For exam results, see Encounter Report.

## 2022-09-21 NOTE — PROGRESS NOTES
Kaiser Herron  is a 46 y.o. male here for a diabetic eye screening with non-dilated fundus photos per .    Patient cooperative?: Yes  Small pupils?: No  Last eye exam: Unknown    For exam results, see Encounter Report.

## 2022-12-21 ENCOUNTER — OFFICE VISIT (OUTPATIENT)
Dept: FAMILY MEDICINE | Facility: CLINIC | Age: 47
End: 2022-12-21
Payer: COMMERCIAL

## 2022-12-21 VITALS
HEART RATE: 78 BPM | BODY MASS INDEX: 44.1 KG/M2 | SYSTOLIC BLOOD PRESSURE: 119 MMHG | HEIGHT: 71 IN | TEMPERATURE: 98 F | RESPIRATION RATE: 18 BRPM | WEIGHT: 315 LBS | OXYGEN SATURATION: 97 % | DIASTOLIC BLOOD PRESSURE: 78 MMHG

## 2022-12-21 DIAGNOSIS — E11.9 TYPE 2 DIABETES MELLITUS WITHOUT COMPLICATION, WITHOUT LONG-TERM CURRENT USE OF INSULIN: Primary | ICD-10-CM

## 2022-12-21 DIAGNOSIS — G47.33 OBSTRUCTIVE SLEEP APNEA SYNDROME: ICD-10-CM

## 2022-12-21 DIAGNOSIS — M25.511 CHRONIC PAIN IN RIGHT SHOULDER: ICD-10-CM

## 2022-12-21 DIAGNOSIS — G89.29 CHRONIC PAIN IN RIGHT SHOULDER: ICD-10-CM

## 2022-12-21 DIAGNOSIS — M17.0 PRIMARY OSTEOARTHRITIS OF BOTH KNEES: ICD-10-CM

## 2022-12-21 DIAGNOSIS — E78.2 HYPERLIPIDEMIA, MIXED: ICD-10-CM

## 2022-12-21 DIAGNOSIS — H65.21 RIGHT CHRONIC SEROUS OTITIS MEDIA: ICD-10-CM

## 2022-12-21 DIAGNOSIS — E66.01 MORBID OBESITY: ICD-10-CM

## 2022-12-21 PROBLEM — F41.9 ANXIETY: Status: ACTIVE | Noted: 2022-12-21

## 2022-12-21 PROBLEM — L98.9 LESION OF FINGER: Status: ACTIVE | Noted: 2022-12-21

## 2022-12-21 PROBLEM — L98.9 DISORDER OF SKIN OF LOWER EXTREMITY: Status: ACTIVE | Noted: 2022-12-21

## 2022-12-21 LAB
ALBUMIN SERPL-MCNC: 3.7 G/DL (ref 3.5–5)
ALBUMIN/GLOB SERPL: 0.9 RATIO (ref 1.1–2)
ALP SERPL-CCNC: 89 UNIT/L (ref 40–150)
ALT SERPL-CCNC: 48 UNIT/L (ref 0–55)
AST SERPL-CCNC: 39 UNIT/L (ref 5–34)
BASOPHILS # BLD AUTO: 0.08 X10(3)/MCL (ref 0–0.2)
BASOPHILS NFR BLD AUTO: 0.8 %
BILIRUBIN DIRECT+TOT PNL SERPL-MCNC: 0.5 MG/DL
BUN SERPL-MCNC: 13.2 MG/DL (ref 8.9–20.6)
CALCIUM SERPL-MCNC: 9.7 MG/DL (ref 8.4–10.2)
CHLORIDE SERPL-SCNC: 103 MMOL/L (ref 98–107)
CHOLEST SERPL-MCNC: 117 MG/DL
CHOLEST/HDLC SERPL: 4 {RATIO} (ref 0–5)
CO2 SERPL-SCNC: 26 MMOL/L (ref 22–29)
CREAT SERPL-MCNC: 0.88 MG/DL (ref 0.73–1.18)
EOSINOPHIL # BLD AUTO: 0.49 X10(3)/MCL (ref 0–0.9)
EOSINOPHIL NFR BLD AUTO: 5.2 %
ERYTHROCYTE [DISTWIDTH] IN BLOOD BY AUTOMATED COUNT: 12.3 % (ref 11.6–14.4)
EST. AVERAGE GLUCOSE BLD GHB EST-MCNC: 159.9 MG/DL
GFR SERPLBLD CREATININE-BSD FMLA CKD-EPI: >60 MLS/MIN/1.73/M2
GLOBULIN SER-MCNC: 4.1 GM/DL (ref 2.4–3.5)
GLUCOSE SERPL-MCNC: 138 MG/DL (ref 74–100)
HBA1C MFR BLD: 7.2 %
HCT VFR BLD AUTO: 45.9 % (ref 42–52)
HDLC SERPL-MCNC: 33 MG/DL (ref 35–60)
HGB BLD-MCNC: 14.8 GM/DL (ref 14–18)
IMM GRANULOCYTES # BLD AUTO: 0.03 X10(3)/MCL (ref 0–0.04)
IMM GRANULOCYTES NFR BLD AUTO: 0.3 %
LDLC SERPL CALC-MCNC: 58 MG/DL (ref 50–140)
LYMPHOCYTES # BLD AUTO: 2.34 X10(3)/MCL (ref 0.6–4.6)
LYMPHOCYTES NFR BLD AUTO: 24.6 %
MCH RBC QN AUTO: 29.4 PG
MCHC RBC AUTO-ENTMCNC: 32.2 MG/DL (ref 33–36)
MCV RBC AUTO: 91.3 FL (ref 80–94)
MONOCYTES # BLD AUTO: 0.62 X10(3)/MCL (ref 0.1–1.3)
MONOCYTES NFR BLD AUTO: 6.5 %
NEUTROPHILS # BLD AUTO: 5.94 X10(3)/MCL (ref 2.1–9.2)
NEUTROPHILS NFR BLD AUTO: 62.6 %
NRBC BLD AUTO-RTO: 0 % (ref 0–1)
PLATELET # BLD AUTO: 268 X10(3)/MCL (ref 140–371)
PMV BLD AUTO: 8.7 FL (ref 9.4–12.4)
POTASSIUM SERPL-SCNC: 4.1 MMOL/L (ref 3.5–5.1)
PROT SERPL-MCNC: 7.8 GM/DL (ref 6.4–8.3)
RBC # BLD AUTO: 5.03 X10(6)/MCL (ref 4.7–6.1)
SODIUM SERPL-SCNC: 139 MMOL/L (ref 136–145)
TRIGL SERPL-MCNC: 128 MG/DL (ref 34–140)
VLDLC SERPL CALC-MCNC: 26 MG/DL
WBC # SPEC AUTO: 9.5 X10(3)/MCL (ref 4.5–11.5)

## 2022-12-21 PROCEDURE — 83036 HEMOGLOBIN GLYCOSYLATED A1C: CPT | Performed by: FAMILY MEDICINE

## 2022-12-21 PROCEDURE — 80061 LIPID PANEL: CPT | Performed by: FAMILY MEDICINE

## 2022-12-21 PROCEDURE — 80053 COMPREHEN METABOLIC PANEL: CPT | Performed by: FAMILY MEDICINE

## 2022-12-21 PROCEDURE — 85025 COMPLETE CBC W/AUTO DIFF WBC: CPT | Performed by: FAMILY MEDICINE

## 2022-12-21 PROCEDURE — 36415 COLL VENOUS BLD VENIPUNCTURE: CPT | Performed by: FAMILY MEDICINE

## 2022-12-21 PROCEDURE — 99214 OFFICE O/P EST MOD 30 MIN: CPT | Mod: PBBFAC | Performed by: FAMILY MEDICINE

## 2022-12-21 RX ORDER — DULAGLUTIDE 3 MG/.5ML
3 INJECTION, SOLUTION SUBCUTANEOUS
Qty: 4 PEN | Refills: 11 | Status: SHIPPED | OUTPATIENT
Start: 2022-12-21 | End: 2023-02-06

## 2022-12-21 NOTE — PROGRESS NOTES
Subjective:       Patient ID: Kaiser Herron Jr. is a 46 y.o. male.    Chief Complaint: Follow-up and Diabetes  46-year-old established patient with history of diabetes mellitus, elevated BMI, CONCEPCIÓN on CPAP, severe allergic rhinitis and tinnitus presents for follow-up of his chronic medical conditions. He also had COVID-19 in January and has no residual symptoms.  Reports continued complaint today of right ear discomfort/fullness.  He has not followed up with ENT as he did not complete CT of the sinuses /MRI 8th nerve and ENT cancelled his follow-up appointment.  The cost of the CT of the sinuses or MRI was $800 out of pocket.  He still has not met his deductible and hence has not scheduled  Diabetes mellitus Type 2:   Controlled at last assessment -A1c 6.4% 09/21/2022 after adding Trulicity  Previously 7.4% 6/13/22  on metformin only (6.4% -11/17/2021)  Repeat A1c today 7.2% 12/21/2022-reviewed with patient  Current Meds:  Metformin, Trulicity added 6/22 titrated to current dose of of 1.5 mg weekly                             Adherent to current treatment plan   Diet/Exercise:  Has not been pursuing healthier diet see had previously.  Very active in his job but not counting steps are aware of how much exercising he is doing.   Elevated BMI: Maximum BMI was 51.5 on June 28, 2021;BMI is 49.71 in June. Current BMI 50.35. Overall 12 pound reduction in weight.    MedicAlert identification: None  Hypoglycemia : No episodes; no confusion, dizziness, headaches, hunger, mood changes, nervousness/anxiousness, pallor, seizures, sleepiness, speech difficulty, sweats or tremors. no blackouts, no hospitalization, no nocturnal hypoglycemia, no required assistance and no required glucagon injection  DM ROS: + polydipsia; pertinent negatives for diabetes include no blurred vision, no chest pain, no fatigue, no foot paresthesias, no foot ulcerations, no polyphagia, no polyuria, no visual change, no weakness and no weight loss.    Diabetic comobidities/complications: no autonomic neuropathy, CVA, heart disease, impotence, nephropathy, peripheral neuropathy, PVD or retinopathy.   Risk factors for coronary artery disease : obesity, sedentary lifestyle, DM male sex. compliant with treatment most of the time. He has not had a previous visit with a dietitian. . He monitors blood glucose at home 1-2 x per week. He monitors urine at home 1-2 x per day. Eye exam is current.   Tinnitus/severe allergic rhinitis:   Seen by ENT -deferred MRI to evaluate a for 8th nerve because he had not met deductible Continues to have tinnitus /right ear pressure discomfort  CONCEPCIÓN: Compliant with  CPAP 6-8 hours per night and wakes feeling well rested.   Past Medical History:   Diagnosis Date    Allergic rhinitis due to pollen     Anxiety disorder, unspecified     DM (diabetes mellitus)     History of 2019 novel coronavirus disease (COVID-19)     CONCEPCIÓN (obstructive sleep apnea)     Osteoarthritis, knee      Current Outpatient Medications on File Prior to Visit   Medication Sig Dispense Refill    cholecalciferol, vitamin D3, 125 mcg (5,000 unit) capsule Take 1 capsule (5,000 Units total) by mouth every morning. 90 capsule 3    clonazePAM (KLONOPIN) 0.5 MG tablet Take 0.5 mg by mouth 3 (three) times daily as needed.      DULoxetine (CYMBALTA) 60 MG capsule Take 60 mg by mouth once daily.      metFORMIN (GLUCOPHAGE) 500 MG tablet Take 2 tablets (1,000 mg total) by mouth 2 (two) times daily with meals. 360 tablet 3    rosuvastatin (CRESTOR) 20 MG tablet Take 1 tablet (20 mg total) by mouth once daily. 90 tablet 3    triamcinolone acetonide 0.1% (KENALOG) 0.1 % ointment Apply 1 application topically 3 (three) times daily as needed.      [DISCONTINUED] amoxicillin (AMOXIL) 875 MG tablet Take 1 tablet (875 mg total) by mouth every 12 (twelve) hours. (Patient not taking: Reported on 12/21/2022) 20 tablet 0    [DISCONTINUED] dulaglutide (TRULICITY) 1.5 mg/0.5 mL pen injector  "Inject 1.5 mg into the skin every 7 days. 4 pen 2     No current facility-administered medications on file prior to visit.       Review of Systems   Constitutional:  Negative for fatigue.   HENT:  Positive for ear pain (annoying discomfort with associated ringing -feels stopped up).    Cardiovascular:  Negative for chest pain.   Endocrine: Negative for polydipsia, polyphagia and polyuria.   Integumentary:  Negative for pallor.   Neurological:  Negative for dizziness, tremors, seizures, speech difficulty, weakness and headaches.   Psychiatric/Behavioral:  Negative for confusion. The patient is not nervous/anxious.           Objective:      Vitals:    12/21/22 0846   BP: 119/78   Pulse: 78   Resp: 18   Temp: 98.2 °F (36.8 °C)   /78 (BP Location: Right arm, Patient Position: Sitting, BP Method: Large (Automatic))   Pulse 78   Temp 98.2 °F (36.8 °C) (Oral)   Resp 18   Ht 5' 11" (1.803 m)   Wt (!) 163.7 kg (361 lb)   SpO2 97%   BMI 50.35 kg/m²       Physical Exam  Constitutional:       General: He is not in acute distress.     Appearance: He is obese. He is not ill-appearing, toxic-appearing or diaphoretic.   HENT:      Left Ear: Tympanic membrane and external ear normal.      Ears:      Comments: Left TM dull bulging with evidence of scarring and serous otitis media  Cardiovascular:      Rate and Rhythm: Normal rate and regular rhythm.      Heart sounds:     No gallop.   Pulmonary:      Effort: Pulmonary effort is normal. No respiratory distress.      Breath sounds: No wheezing or rales.   Neurological:      Mental Status: He is alert and oriented to person, place, and time.   Psychiatric:         Mood and Affect: Mood normal.         Behavior: Behavior normal.        Latest Reference Range & Units 12/21/22 10:00 12/21/22 14:16   WBC 4.5 - 11.5 x10(3)/mcL  9.5   RBC 4.70 - 6.10 x10(6)/mcL  5.03   HEMOGLOBIN 14.0 - 18.0 gm/dL  14.8   HEMATOCRIT 42.0 - 52.0 %  45.9   MCV 80.0 - 94.0 fL  91.3   MCH pg  29.4 "   MCHC 33.0 - 36.0 mg/dL  32.2 (L)   RDW 11.6 - 14.4 %  12.3   Platelets 140 - 371 x10(3)/mcL  268   MPV 9.4 - 12.4 fL  8.7 (L)   Neut % %  62.6   LYMPH % %  24.6   Mono % %  6.5   Eosinophil % %  5.2   Basophil % %  0.8   Immature Granulocytes %  0.3   Neut # 2.1 - 9.2 x10(3)/mcL  5.94   Lymph # 0.6 - 4.6 x10(3)/mcL  2.34   Mono # 0.1 - 1.3 x10(3)/mcL  0.62   Eos # 0 - 0.9 x10(3)/mcL  0.49   Baso # 0 - 0.2 x10(3)/mcL  0.08   Immature Grans (Abs) 0 - 0.04 x10(3)/mcL  0.03   nRBC 0 - 1 %  0.0   Sodium 136 - 145 mmol/L 139    Potassium 3.5 - 5.1 mmol/L 4.1    Chloride 98 - 107 mmol/L 103    CO2 22 - 29 mmol/L 26    BUN 8.9 - 20.6 mg/dL 13.2    Creatinine 0.73 - 1.18 mg/dL 0.88    eGFR mls/min/1.73/m2 >60    Glucose 74 - 100 mg/dL 138 (H)    Calcium 8.4 - 10.2 mg/dL 9.7    Alkaline Phosphatase 40 - 150 unit/L 89    PROTEIN TOTAL 6.4 - 8.3 gm/dL 7.8    Albumin 3.5 - 5.0 g/dL 3.7    Albumin/Globulin Ratio 1.1 - 2.0 ratio 0.9 (L)    BILIRUBIN TOTAL <=1.5 mg/dL 0.5    AST 5 - 34 unit/L 39 (H)    ALT 0 - 55 unit/L 48    Globulin, Total 2.4 - 3.5 gm/dL 4.1 (H)    Cholesterol <=200 mg/dL  117   HDL 35 - 60 mg/dL  33 (L)   LDL Cholesterol External 50.00 - 140.00 mg/dL  58.00   Total Cholesterol/HDL Ratio 0 - 5   4   Triglycerides 34 - 140 mg/dL  128   Very Low Density Lipoprotein   26   Hemoglobin A1C External <=7.0 % 7.2 (H)    Estimated Avg Glucose mg/dL 159.9      Assessment/Plan:  Type 2 diabetes mellitus without complication, without long-term current use of insulin  -     Comprehensive Metabolic Panel glucose 138 AST 39  -     Hemoglobin A1C- 7.2%  -     increase dulaglutide (TRULICITY) 3 mg/0.5 mL pen injector; Inject 3 mg into the skin every 7 days.  Dispense: 4 pen; Refill: 11  -     continue metformin as prescribed    Obstructive sleep apnea syndrome  -     CBC Auto Differential-no acute concerns  -      continued compliance with CPAP endorse  Chronic pain in right shoulder        -     symptoms stable; rare p.r.n.  NSAIDs no other intervention at this time  Primary osteoarthritis of both knees        -     symptoms stable; rare p.r.n. NSAIDs no other intervention at this time  Morbid obesity        -Counseled on diet and exercise extensively; discussed use of fit but or other device to monitor physical activity  Hyperlipidemia, mixed  -     Lipid Panel total cholesterol 117 HDL 33 triglyceride 128 LDL 58; results reviewed with patient  -     elevated AST may be the result of fatty liver versus statin side effect; we will continue to monitor  -      diet extensively discussed and exercise endorsed due to low HDL  Right chronic serous otitis media          -      encouraged to reschedule appointment with ENT to determine if intervention needed due to chronic serous otitis media   Follow up in about 6 weeks (around 2/1/2023).

## 2023-02-04 DIAGNOSIS — E11.9 TYPE 2 DIABETES MELLITUS WITHOUT COMPLICATION, WITHOUT LONG-TERM CURRENT USE OF INSULIN: ICD-10-CM

## 2023-02-04 DIAGNOSIS — E66.01 MORBID OBESITY: ICD-10-CM

## 2023-02-06 DIAGNOSIS — E11.9 TYPE 2 DIABETES MELLITUS WITHOUT COMPLICATION, WITHOUT LONG-TERM CURRENT USE OF INSULIN: ICD-10-CM

## 2023-02-06 RX ORDER — CIPROFLOXACIN AND DEXAMETHASONE 3; 1 MG/ML; MG/ML
SUSPENSION/ DROPS AURICULAR (OTIC)
COMMUNITY
Start: 2023-01-21 | End: 2023-04-26 | Stop reason: ALTCHOICE

## 2023-02-06 RX ORDER — DULAGLUTIDE 3 MG/.5ML
3 INJECTION, SOLUTION SUBCUTANEOUS
Qty: 4 PEN | Refills: 11 | Status: SHIPPED | OUTPATIENT
Start: 2023-02-06 | End: 2023-04-26 | Stop reason: SDUPTHER

## 2023-02-06 RX ORDER — DULAGLUTIDE 1.5 MG/.5ML
3 INJECTION, SOLUTION SUBCUTANEOUS
Qty: 8 PEN | Refills: 11 | Status: SHIPPED | OUTPATIENT
Start: 2023-02-06 | End: 2023-04-26

## 2023-02-06 NOTE — TELEPHONE ENCOUNTER
I have signed the following orders and or meds but have changed the amount injected.  Medications Ordered This Encounter   Medications    dulaglutide (TRULICITY) 1.5 mg/0.5 mL pen injector     Sig: Inject 3 mg into the skin every 7 days.     Dispense:  8 pen     Refill:  11         Thank you,    SAM

## 2023-02-06 NOTE — PROGRESS NOTES
Patient is requesting refill of his Trulicity 1.5 mg pen because he can not get the 3.0 mg pen.  I will attempt to prescribed to 1.5 mg injections weekly, but if his insurance does not cover this we will continue with 1.5 mg weekly.

## 2023-02-08 ENCOUNTER — OFFICE VISIT (OUTPATIENT)
Dept: FAMILY MEDICINE | Facility: CLINIC | Age: 48
End: 2023-02-08
Payer: COMMERCIAL

## 2023-02-08 VITALS
HEIGHT: 71 IN | HEART RATE: 74 BPM | SYSTOLIC BLOOD PRESSURE: 120 MMHG | WEIGHT: 315 LBS | RESPIRATION RATE: 18 BRPM | OXYGEN SATURATION: 97 % | TEMPERATURE: 98 F | BODY MASS INDEX: 44.1 KG/M2 | DIASTOLIC BLOOD PRESSURE: 77 MMHG

## 2023-02-08 DIAGNOSIS — G47.33 OBSTRUCTIVE SLEEP APNEA SYNDROME: ICD-10-CM

## 2023-02-08 DIAGNOSIS — Z12.11 SCREENING FOR COLON CANCER: ICD-10-CM

## 2023-02-08 DIAGNOSIS — E55.9 VITAMIN D DEFICIENCY: ICD-10-CM

## 2023-02-08 DIAGNOSIS — E11.9 TYPE 2 DIABETES MELLITUS WITHOUT COMPLICATION, WITHOUT LONG-TERM CURRENT USE OF INSULIN: Primary | ICD-10-CM

## 2023-02-08 PROCEDURE — 99215 OFFICE O/P EST HI 40 MIN: CPT | Mod: PBBFAC | Performed by: FAMILY MEDICINE

## 2023-02-08 RX ORDER — INSULIN PUMP SYRINGE, 3 ML
EACH MISCELLANEOUS
Qty: 1 EACH | Refills: 0 | Status: SHIPPED | OUTPATIENT
Start: 2023-02-08 | End: 2024-02-08

## 2023-02-08 NOTE — PROGRESS NOTES
Subjective:       Patient ID: Kaiser Herron Jr. is a 47 y.o. male.    Chief Complaint: Follow-up and Diabetes    HPI  47-year-old established patient with history of diabetes mellitus, elevated BMI, CONCEPCIÓN on CPAP, severe allergic rhinitis and tinnitus presents for follow-up of his chronic medical conditions. Reports continued complaint today of right ear discomfort/fullness.  He has not followed up with ENT as he did not complete CT of the sinuses /MRI 8th nerve and ENT cancelled his follow-up appointment.  The cost of the CT of the sinuses or MRI was $800 out of pocket.  He still has not met his deductible and hence has not scheduled  Recurrent sinusitis; Intermittent KB/Recent OE :  Went to [facility] 1/21/23 and was prescribed ear drops for OE  Symptoms now resolved   Completed Cipro-Dex  Diabetes mellitus Type 2:   Originally controlled  -A1c 6.4% 09/21/2022 after adding Trulicity  Previously 7.4% 6/13/22  on metformin only (6.4% -11/17/2021)  Repeat A1c 7.2% 12/21/2022-  Trulicity was increased to 3.0 mg weekly but he has not been able to obtain it   Current Meds:  Metformin, Trulicity 1.5 mg weekly while awaiting new dose; Adherent to current treatment plan   Diet/Exercise:    Has not been pursuing healthier diet see had previously.    Very active in his job but not counting steps or  aware of how much exercising he is doing.   Elevated BMI: Maximum BMI was 51.5 on June 28, 2021;BMI is 49.71 in June. Current BMI 50.15. Overall 12.5 pound reduction in weight.    MedicAlert identification: None  Hypoglycemia : No episodes; no confusion, dizziness, headaches, hunger, mood changes, nervousness/anxiousness, pallor, seizures, sleepiness, speech difficulty, sweats or tremors. no blackouts, no hospitalization, no nocturnal hypoglycemia, no required assistance and no required glucagon injection  DM ROS: + polydipsia; pertinent negatives for diabetes include no blurred vision, no chest pain, no fatigue, no foot  "paresthesias, no foot ulcerations, no polyphagia, no polyuria, no visual change, no weakness and no weight loss.   Diabetic comobidities/complications: no autonomic neuropathy, CVA, heart disease, impotence, nephropathy, peripheral neuropathy, PVD or retinopathy.   Risk factors for coronary artery disease : obesity, sedentary lifestyle, DM male sex. compliant with treatment most of the time. He has not had a previous visit with a dietitian. . He monitors blood glucose at home 1-2 x per week. He monitors urine at home 1-2 x per day. Eye exam is current.   Tinnitus/severe allergic rhinitis:   Seen by ENT -deferred MRI to evaluate a for 8th nerve because he had not met deductible Continues to have tinnitus /right ear pressure discomfort  CONCEPCIÓN: Compliant with  CPAP 6-8 hours per night and wakes feeling well rested.   Review of Systems   Constitutional:  Negative for fatigue.   Cardiovascular:  Negative for chest pain.   Endocrine: Positive for polydipsia. Negative for polyphagia and polyuria.   Integumentary:  Negative for pallor.   Neurological:  Negative for dizziness, tremors, seizures, speech difficulty, weakness and headaches.   Psychiatric/Behavioral:  Negative for confusion. The patient is not nervous/anxious.           Objective:      Vitals:    02/08/23 0927   BP: 120/77   Pulse: 74   Resp: 18   Temp: 98.1 °F (36.7 °C)   /77 (BP Location: Right arm, Patient Position: Sitting, BP Method: Large (Automatic))   Pulse 74   Temp 98.1 °F (36.7 °C) (Oral)   Resp 18   Ht 5' 11" (1.803 m)   Wt (!) 163.1 kg (359 lb 9.6 oz)   SpO2 97%   BMI 50.15 kg/m²      Latest Reference Range & Units 12/21/22 10:00 12/21/22 14:16   WBC 4.5 - 11.5 x10(3)/mcL  9.5   RBC 4.70 - 6.10 x10(6)/mcL  5.03   Hemoglobin 14.0 - 18.0 gm/dL  14.8   Hematocrit 42.0 - 52.0 %  45.9   MCV 80.0 - 94.0 fL  91.3   MCH pg  29.4   MCHC 33.0 - 36.0 mg/dL  32.2 (L)   RDW 11.6 - 14.4 %  12.3   Platelets 140 - 371 x10(3)/mcL  268   MPV 9.4 - 12.4 fL  " 8.7 (L)   Neut % %  62.6   LYMPH % %  24.6   Mono % %  6.5   Eosinophil % %  5.2   Basophil % %  0.8   Immature Granulocytes %  0.3   Neut # 2.1 - 9.2 x10(3)/mcL  5.94   Lymph # 0.6 - 4.6 x10(3)/mcL  2.34   Mono # 0.1 - 1.3 x10(3)/mcL  0.62   Eos # 0 - 0.9 x10(3)/mcL  0.49   Baso # 0 - 0.2 x10(3)/mcL  0.08   Immature Grans (Abs) 0 - 0.04 x10(3)/mcL  0.03   nRBC 0 - 1 %  0.0   Sodium 136 - 145 mmol/L 139    Potassium 3.5 - 5.1 mmol/L 4.1    Chloride 98 - 107 mmol/L 103    CO2 22 - 29 mmol/L 26    BUN 8.9 - 20.6 mg/dL 13.2    Creatinine 0.73 - 1.18 mg/dL 0.88    eGFR mls/min/1.73/m2 >60    Glucose 74 - 100 mg/dL 138 (H)    Calcium 8.4 - 10.2 mg/dL 9.7    Alkaline Phosphatase 40 - 150 unit/L 89    PROTEIN TOTAL 6.4 - 8.3 gm/dL 7.8    Albumin 3.5 - 5.0 g/dL 3.7    Albumin/Globulin Ratio 1.1 - 2.0 ratio 0.9 (L)    BILIRUBIN TOTAL <=1.5 mg/dL 0.5    AST 5 - 34 unit/L 39 (H)    ALT 0 - 55 unit/L 48    Globulin, Total 2.4 - 3.5 gm/dL 4.1 (H)    Cholesterol <=200 mg/dL  117   HDL 35 - 60 mg/dL  33 (L)   LDL Cholesterol External 50.00 - 140.00 mg/dL  58.00   Total Cholesterol/HDL Ratio 0 - 5   4   Triglycerides 34 - 140 mg/dL  128   Very Low Density Lipoprotein   26   Hemoglobin A1C External <=7.0 % 7.2 (H)    Estimated Avg Glucose mg/dL 159.9        Physical Exam  Constitutional:       General: He is not in acute distress.     Appearance: He is obese. He is not ill-appearing, toxic-appearing or diaphoretic.   Cardiovascular:      Rate and Rhythm: Normal rate and regular rhythm.      Pulses: Normal pulses.      Heart sounds: Normal heart sounds.     No friction rub. No gallop.   Pulmonary:      Effort: Pulmonary effort is normal. No respiratory distress.      Breath sounds: Normal breath sounds. No wheezing or rales.   Musculoskeletal:      Right lower leg: No edema.      Left lower leg: No edema.   Neurological:      Mental Status: He is alert and oriented to person, place, and time.      Gait: Gait normal.   Psychiatric:          Mood and Affect: Mood normal.         Behavior: Behavior normal.         Thought Content: Thought content normal.         Judgment: Judgment normal.         Assessment/Plan:  Type 2 diabetes mellitus without complication, without long-term current use of insulin        -     Recommended monitoring of glucose at least once daily  -     blood-glucose meter kit; Use as instructed  Dispense: 1 each; Refill: 0  -     blood sugar diagnostic Strp; Check blood sugar daily and as needed for symptoms of uncontrolled diabetes  Dispense: 100 each; Refill: 3  -     Hemoglobin A1C-before next visit   -     Sent script for Two 1.5 mg Trulicity injections once weekly     Obstructive sleep apnea syndrome        -     Encouraged continued compliance with CPAP         Vitamin D deficiency        - Continue Vitamin D supplementation        - Vitamin D level Next visit   Screening for colon cancer        -     Extensive discussion of options/elected colonoscopy  -     Ambulatory referral/consult to Gastroenterology; Future; Expected date: 02/15/2023         Follow up in about 10 weeks (around 4/19/2023).

## 2023-03-30 LAB — CRC RECOMMENDATION EXT: NORMAL

## 2023-04-21 ENCOUNTER — DOCUMENTATION ONLY (OUTPATIENT)
Dept: FAMILY MEDICINE | Facility: CLINIC | Age: 48
End: 2023-04-21
Payer: COMMERCIAL

## 2023-04-26 ENCOUNTER — OFFICE VISIT (OUTPATIENT)
Dept: FAMILY MEDICINE | Facility: CLINIC | Age: 48
End: 2023-04-26
Payer: COMMERCIAL

## 2023-04-26 VITALS
BODY MASS INDEX: 44.1 KG/M2 | HEIGHT: 71 IN | OXYGEN SATURATION: 98 % | HEART RATE: 76 BPM | DIASTOLIC BLOOD PRESSURE: 71 MMHG | WEIGHT: 315 LBS | RESPIRATION RATE: 11 BRPM | SYSTOLIC BLOOD PRESSURE: 113 MMHG | TEMPERATURE: 99 F

## 2023-04-26 VITALS
TEMPERATURE: 99 F | BODY MASS INDEX: 44.1 KG/M2 | SYSTOLIC BLOOD PRESSURE: 113 MMHG | OXYGEN SATURATION: 98 % | DIASTOLIC BLOOD PRESSURE: 71 MMHG | HEIGHT: 71 IN | RESPIRATION RATE: 18 BRPM | HEART RATE: 76 BPM | WEIGHT: 315 LBS

## 2023-04-26 DIAGNOSIS — L40.0 PLAQUE PSORIASIS: ICD-10-CM

## 2023-04-26 DIAGNOSIS — E78.2 HYPERLIPIDEMIA, MIXED: ICD-10-CM

## 2023-04-26 DIAGNOSIS — L98.9 SKIN LESION: Primary | ICD-10-CM

## 2023-04-26 DIAGNOSIS — G47.33 OBSTRUCTIVE SLEEP APNEA SYNDROME: ICD-10-CM

## 2023-04-26 DIAGNOSIS — E11.9 TYPE 2 DIABETES MELLITUS WITHOUT COMPLICATION, WITHOUT LONG-TERM CURRENT USE OF INSULIN: Primary | ICD-10-CM

## 2023-04-26 DIAGNOSIS — E55.9 VITAMIN D DEFICIENCY: ICD-10-CM

## 2023-04-26 PROBLEM — Z12.11 COLON CANCER SCREENING: Status: ACTIVE | Noted: 2023-03-23

## 2023-04-26 PROBLEM — Z12.11 ENCOUNTER FOR SCREENING FOR MALIGNANT NEOPLASM OF COLON: Status: ACTIVE | Noted: 2023-04-26

## 2023-04-26 PROBLEM — E78.5 HYPERLIPIDEMIA: Status: ACTIVE | Noted: 2022-12-21

## 2023-04-26 PROBLEM — G47.30 SLEEP APNEA: Status: ACTIVE | Noted: 2022-05-04

## 2023-04-26 LAB
CREAT UR-MCNC: 168.9 MG/DL (ref 63–166)
DEPRECATED CALCIDIOL+CALCIFEROL SERPL-MC: 64.8 NG/ML (ref 30–80)
EST. AVERAGE GLUCOSE BLD GHB EST-MCNC: 154.2 MG/DL
HBA1C MFR BLD: 7 %
MICROALBUMIN UR-MCNC: 17.6 UG/ML
MICROALBUMIN/CREAT RATIO PNL UR: 10.4 MG/GM CR (ref 0–30)

## 2023-04-26 PROCEDURE — 99215 OFFICE O/P EST HI 40 MIN: CPT | Mod: PBBFAC,25 | Performed by: FAMILY MEDICINE

## 2023-04-26 PROCEDURE — 36415 COLL VENOUS BLD VENIPUNCTURE: CPT | Performed by: FAMILY MEDICINE

## 2023-04-26 PROCEDURE — 82043 UR ALBUMIN QUANTITATIVE: CPT | Performed by: FAMILY MEDICINE

## 2023-04-26 PROCEDURE — 99214 OFFICE O/P EST MOD 30 MIN: CPT | Mod: PBBFAC,27 | Performed by: FAMILY MEDICINE

## 2023-04-26 PROCEDURE — 83036 HEMOGLOBIN GLYCOSYLATED A1C: CPT | Performed by: FAMILY MEDICINE

## 2023-04-26 PROCEDURE — 88305 TISSUE EXAM BY PATHOLOGIST: CPT | Mod: TC | Performed by: FAMILY MEDICINE

## 2023-04-26 PROCEDURE — 11102 TANGNTL BX SKIN SINGLE LES: CPT | Mod: PBBFAC | Performed by: FAMILY MEDICINE

## 2023-04-26 PROCEDURE — 82306 VITAMIN D 25 HYDROXY: CPT | Performed by: FAMILY MEDICINE

## 2023-04-26 RX ORDER — SODIUM, POTASSIUM,MAG SULFATES 17.5-3.13G
SOLUTION, RECONSTITUTED, ORAL ORAL
COMMUNITY
Start: 2023-03-21 | End: 2023-08-23 | Stop reason: ALTCHOICE

## 2023-04-26 RX ORDER — DULAGLUTIDE 3 MG/.5ML
3 INJECTION, SOLUTION SUBCUTANEOUS
Qty: 4 PEN | Refills: 11 | Status: SHIPPED | OUTPATIENT
Start: 2023-04-26 | End: 2023-12-04 | Stop reason: ALTCHOICE

## 2023-04-26 RX ORDER — ROSUVASTATIN CALCIUM 20 MG/1
20 TABLET, COATED ORAL DAILY
Qty: 90 TABLET | Refills: 3 | Status: SHIPPED | OUTPATIENT
Start: 2023-04-26 | End: 2023-07-05 | Stop reason: SDUPTHER

## 2023-04-26 RX ORDER — LIDOCAINE HYDROCHLORIDE 10 MG/ML
2 INJECTION, SOLUTION EPIDURAL; INFILTRATION; INTRACAUDAL; PERINEURAL
Status: COMPLETED | OUTPATIENT
Start: 2023-04-26 | End: 2023-04-26

## 2023-04-26 RX ORDER — SOD SULF/POT CHLORIDE/MAG SULF 1.479 G
1 TABLET ORAL
COMMUNITY
Start: 2023-03-28 | End: 2023-04-26

## 2023-04-26 RX ORDER — METFORMIN HYDROCHLORIDE 500 MG/1
2000 TABLET, EXTENDED RELEASE ORAL
Qty: 120 TABLET | Refills: 2 | Status: SHIPPED | OUTPATIENT
Start: 2023-04-26 | End: 2023-08-14

## 2023-04-26 RX ORDER — METFORMIN HYDROCHLORIDE 500 MG/1
2000 TABLET, EXTENDED RELEASE ORAL
COMMUNITY
End: 2023-04-26 | Stop reason: SDUPTHER

## 2023-04-26 RX ADMIN — LIDOCAINE HYDROCHLORIDE 20 MG: 10 INJECTION, SOLUTION EPIDURAL; INFILTRATION; INTRACAUDAL; PERINEURAL at 11:04

## 2023-04-26 NOTE — PROGRESS NOTES
Subjective:       Patient ID: Kasier Herron Jr. is a 47 y.o. male.    Chief Complaint: skin lesion (On fourth digit of left hand)    HPI  46 yo in minor surgery for a skin lesion on his left 4th finger. Pt had it treated with cryo in the past with good results but states that it has since come back and will get irritated at times. 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 He would like to have this lesion on his finger removed today.     Review of Systems  As per HPI         Objective:      Vitals:    04/26/23 1121   BP: 113/71   Pulse: 76   Resp: 18   Temp: 98.6 °F (37 °C)       Physical Exam    Gen: alert, no acute distress  Skin: left 4th finger- raised, firm nodule without ulceration or irregular pigment changes  Psych: cooperative, appropriate mood and affect    Procedure Note:  Procedure: shave biopsy  Performed by: Lalitha Freed MD  Consent: signed consent obtained after discussion of risks, benefits, and alternative treatments  Description: The area was prepped with alcohol. 1% lidocaine used for anesthesia. A flexible blade was used to obtain the specimen. The specimen was placed in a container to be sent for pathology. Drysol was used to obtain hemostasis. Blood loss was minimal.  The patient tolerated the procedure well with no complications.     Assessment/Plan:  Skin lesion  -     Specimen to Pathology Dermatology and skin neoplasms    Other orders  -     LIDOcaine (PF) 10 mg/ml (1%) injection 20 mg    - shave biopsy today with hopes of removing entire lesion both for diagnosis and symptom relief  - Post care instructions and return precautions discussed.   - will call patient with results and plan for further care if needed based on results     Follow up if symptoms worsen or fail to improve.

## 2023-04-26 NOTE — PROGRESS NOTES
Subjective:       Patient ID: Kaiser Herron Jr. is a 47 y.o. male.    Chief Complaint: Follow-up and Diabetes (Discuss colonoscopy )      47-year-old established patient with history of diabetes mellitus, elevated BMI, CONCEPCIÓN on CPAP, severe allergic rhinitis and tinnitus presents for follow-up of his chronic medical conditions. Completed colonoscopy with great difficulty because liquid SUPREP didn't work  Recurrent sinusitis; Intermittent KB/Recent OE :  Went to [facility] 1/21/23 and was prescribed ear drops for OE  Symptoms now resolved   Completed Cipro-Dex  Never did CT sinuses due to cost   Diabetes mellitus Type 2:   Originally controlled  -A1c 6.4% 09/21/2022 after adding Trulicity  Previously 7.4% 6/13/22  on metformin only (6.4% -11/17/2021)  Repeat A1c 7.2% 12/21/2022  Trulicity 3.0 mg weekly since 2/2023  Discussed increasing to 4.5 mg weekly but defers as he is losing weight and feels missing metformin doses may be affecting his A1c.  Would like to try extended release metformin before increasing dose of Trulicity.  Current Meds:  Metformin, Trulicity 3 mg consistently since 2/2023;   Adherent to current treatment plan except sometimes forgets p.m. dose of metformin when schedule inconsistent  Diet/Exercise:    Has not been pursuing healthier diet see had previously.    Very active in his job but not counting steps or  aware of how much exercising he is doing.   Elevated BMI:   Maximum BMI was 51.5 on June 28, 2021;  BMI is 49.71 in June 2022.  LAST BMI 50.15.   BMI today 4/26/2023 48.68 Weigt 349 lb Overall 12.5 pound reduction in weight.    MedicAlert identification: None  Hypoglycemia : No episodes; no confusion, dizziness, headaches, hunger, mood changes, nervousness/anxiousness, pallor, seizures, sleepiness, speech difficulty, sweats or tremors. no blackouts, no hospitalization, no nocturnal hypoglycemia, no required assistance and no required glucagon injection  DM ROS: + polydipsia- improved ;  "pertinent negatives for diabetes include no blurred vision, no chest pain, no fatigue, no foot paresthesias, no foot ulcerations, no polyphagia, no polyuria, no visual change, no weakness and no weight loss.   Diabetic comobidities/complications: no autonomic neuropathy, CVA, heart disease, impotence, nephropathy, peripheral neuropathy, PVD or retinopathy.   Risk factors for coronary artery disease : obesity, sedentary lifestyle, DM male sex. compliant with treatment most of the time. He has not had a previous visit with a dietitian. . He monitors blood glucose at home 1-2 x per week. He monitors urine at home 1-2 x per day. Eye exam is current.   Tinnitus/severe allergic rhinitis:   Seen by ENT -deferred MRI to evaluate a for 8th nerve because he had not met deductible Continues to have tinnitus /right ear pressure discomfort  CONCEPCÓIN: Compliant with  CPAP 6-8 hours per night and wakes feeling well rested.   Needs new machine - alarm says motor life is running out   Has virtual appt with [employer] in May to discuss    Colonoscopy single benign polyp with follow up 5 yrs- Patient says 10 per nurse who called with pathology report but he also does not want to ever do it again because of his experience  Review of Systems   Constitutional:  Negative for fatigue.   Cardiovascular:  Negative for chest pain.   Endocrine: Negative for polydipsia, polyphagia and polyuria.   Integumentary:  Negative for pallor.   Neurological:  Negative for dizziness, tremors, seizures, speech difficulty, weakness and headaches.   Psychiatric/Behavioral:  Negative for confusion. The patient is not nervous/anxious.           Objective:      Vitals:    04/26/23 0935   BP: 113/71   Pulse: 76   Resp: 11   Temp: 98.6 °F (37 °C)   /71 (BP Location: Right arm, Patient Position: Sitting, BP Method: Large (Automatic))   Pulse 76   Temp 98.6 °F (37 °C) (Oral)   Resp 11   Ht 5' 11" (1.803 m)   Wt (!) 158.3 kg (349 lb)   SpO2 98%   BMI 48.68 " kg/m²    [facility] Reference Range & Units 12/21/22 10:00 12/21/22 14:16   WBC 4.5 - 11.5 x10(3)/mcL  9.5   RBC 4.70 - 6.10 x10(6)/mcL  5.03   Hemoglobin 14.0 - 18.0 gm/dL  14.8   Hematocrit 42.0 - 52.0 %  45.9   MCV 80.0 - 94.0 fL  91.3   MCH pg  29.4   MCHC 33.0 - 36.0 mg/dL  32.2 (L)   RDW 11.6 - 14.4 %  12.3   Platelets 140 - 371 x10(3)/mcL  268   MPV 9.4 - 12.4 fL  8.7 (L)   Neut % %  62.6   LYMPH % %  24.6   Mono % %  6.5   Eosinophil % %  5.2   Basophil % %  0.8   Immature Granulocytes %  0.3   Neut # 2.1 - 9.2 x10(3)/mcL  5.94   Lymph # 0.6 - 4.6 x10(3)/mcL  2.34   Mono # 0.1 - 1.3 x10(3)/mcL  0.62   Eos # 0 - 0.9 x10(3)/mcL  0.49   Baso # 0 - 0.2 x10(3)/mcL  0.08   Immature Grans (Abs) 0 - 0.04 x10(3)/mcL  0.03   nRBC 0 - 1 %  0.0   Sodium 136 - 145 mmol/L 139    Potassium 3.5 - 5.1 mmol/L 4.1    Chloride 98 - 107 mmol/L 103    CO2 22 - 29 mmol/L 26    BUN 8.9 - 20.6 mg/dL 13.2    Creatinine 0.73 - 1.18 mg/dL 0.88    eGFR mls/min/1.73/m2 >60    Glucose 74 - 100 mg/dL 138 (H)    Calcium 8.4 - 10.2 mg/dL 9.7    Alkaline Phosphatase 40 - 150 unit/L 89    PROTEIN TOTAL 6.4 - 8.3 gm/dL 7.8    Albumin 3.5 - 5.0 g/dL 3.7    Albumin/Globulin Ratio 1.1 - 2.0 ratio 0.9 (L)    BILIRUBIN TOTAL <=1.5 mg/dL 0.5    AST 5 - 34 unit/L 39 (H)    ALT 0 - 55 unit/L 48    Globulin, Total 2.4 - 3.5 gm/dL 4.1 (H)    Cholesterol <=200 mg/dL  117   HDL 35 - 60 mg/dL  33 (L)   LDL Cholesterol External 50.00 - 140.00 mg/dL  58.00   Total Cholesterol/HDL Ratio 0 - 5   4   Triglycerides 34 - 140 mg/dL  128   Very Low Density Lipoprotein   26   Hemoglobin A1C External <=7.0 % 7.2 (H)    Estimated Avg Glucose mg/dL 159.9       Latest Reference Range & Units 04/26/23 09:25 04/26/23 11:40 04/26/23 12:24   Vit D, 25-Hydroxy 30.0 - 80.0 ng/mL 64.8     Hemoglobin A1C External <=7.0 % 7.0     Estimated Avg Glucose mg/dL 154.2     Creatinine, Urine 63.0 - 166.0 mg/dL   168.9 (H)   Urine Microalbumin <=30.0 ug/ml   17.6   MICROALB/CREAT RATIO 0.0 -  30.0 mg/gm Cr   10.4   SPECIMEN TO PATHOLOGY   Rpt      Physical Exam  Constitutional:       General: He is not in acute distress.     Appearance: He is obese. He is not ill-appearing, toxic-appearing or diaphoretic.   Cardiovascular:      Rate and Rhythm: Normal rate and regular rhythm.   Pulmonary:      Effort: Pulmonary effort is normal. No respiratory distress.      Breath sounds: No stridor. No wheezing, rhonchi or rales.   Skin:     Comments: 7 x 8 cm patch of psoriasis right distal thigh anteriorly   Neurological:      Mental Status: He is alert.         Assessment/Plan:  Type 2 diabetes mellitus without complication, without long-term current use of insulin  -     Hemoglobin A1C-7% today  -     Microalbumin/Creatinine Ratio, Urine-normal  -     Offered referral to Nutritional Counseling -declines at this time   -     Discussed increasing Trulicity to 4.5; desires to continue current dose  Refill Trulicity 3 mg/0.5 mL pen injector; Inject 3 mg into the skin every 7 days.  Dispense: 4 pen; Refill: 11  -    change to once daily extended release with largest meal to enhance compliance   metFORMIN (GLUCOPHAGE-XR) 500 MG ER 24hr tablet; Take 4 tablets (2,000 mg total) by mouth with lunch.  Dispense: 120 tablet; Refill: 2    Vitamin D deficiency  -     Vitamin D within normal limits    Hyperlipidemia, mixed  -     rosuvastatin (CRESTOR) 20 MG tablet; Take 1 tablet (20 mg total) by mouth once daily.  Dispense: 90 tablet; Refill: 3  Plaque psoriasis less than 5% of body  Patient has topical steroid ointment and declines any other intervention at this time   CONCEPCIÓN  Compliant with CPAP however needs new machine  Keep scheduled appointment with provider of CPAP supplies  Follow up in about 3 months (around 7/26/2023).

## 2023-04-27 PROBLEM — L40.0 PLAQUE PSORIASIS: Status: ACTIVE | Noted: 2023-04-27

## 2023-05-01 LAB
DHEA SERPL-MCNC: NORMAL
ESTROGEN SERPL-MCNC: NORMAL PG/ML
INSULIN SERPL-ACNC: NORMAL U[IU]/ML
LAB AP GROSS DESCRIPTION: NORMAL
LAB AP REPORT FOOTNOTES: NORMAL
T3RU NFR SERPL: NORMAL %

## 2023-05-16 ENCOUNTER — TELEPHONE (OUTPATIENT)
Dept: FAMILY MEDICINE | Facility: CLINIC | Age: 48
End: 2023-05-16
Payer: COMMERCIAL

## 2023-06-06 ENCOUNTER — OFFICE VISIT (OUTPATIENT)
Dept: FAMILY MEDICINE | Facility: CLINIC | Age: 48
End: 2023-06-06
Payer: COMMERCIAL

## 2023-06-06 VITALS
BODY MASS INDEX: 44.1 KG/M2 | HEART RATE: 76 BPM | TEMPERATURE: 99 F | HEIGHT: 71 IN | RESPIRATION RATE: 18 BRPM | OXYGEN SATURATION: 99 % | DIASTOLIC BLOOD PRESSURE: 78 MMHG | WEIGHT: 315 LBS | SYSTOLIC BLOOD PRESSURE: 139 MMHG

## 2023-06-06 DIAGNOSIS — D23.9 DERMATOFIBROMA: Primary | ICD-10-CM

## 2023-06-06 PROCEDURE — 99214 OFFICE O/P EST MOD 30 MIN: CPT | Mod: PBBFAC

## 2023-06-06 PROCEDURE — 17110 DESTRUCTION B9 LES UP TO 14: CPT | Mod: PBBFAC | Performed by: STUDENT IN AN ORGANIZED HEALTH CARE EDUCATION/TRAINING PROGRAM

## 2023-06-06 NOTE — PROGRESS NOTES
[facility] Procedure OFFICE VISIT NOTE  Kaiser Herron Jr.  [id number]  06/06/2023      Chief Complaint: re check lesion hand      HPI    Kasier Herron Jr. is a 47 y.o. 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     -healing well.  Has a scab that has peeled off several times. Does not feel like area is getting any bigger.  Denies any signs of infection.  Would like entire area to be gone.     ROS:  CONSTITUTIONAL: No weight loss, fever, chills, or weakness.     CARDIOVASCULAR: No chest pain, chest pressure, or chest discomfort. No palpitations.    RESPIRATORY: No shortness of breath, cough, or sputum.    GASTROINTESTINAL: No nausea, vomiting or diarrhea. No abdominal pain    Vitals:    06/06/23 0839   BP: 139/78   Pulse: 76   Resp: 18   Temp: 98.6 °F (37 °C)       PE:  General: appears well, in no acute distress   Eye: no scleral icterus   HENT: MMM  Respiratory: nonlabored respirations   Cardiovascular: no edema in bilateral lower extremities   Integumentary: lesion to L 4th finger with overlying scab, firm texture surrounding scab.  No drainage.      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 No barriers to communication or understanding identified. After obtaining informed consent, the patient's identity, procedure, and site were verified during a pause prior to proceeding with the minor surgical procedure as per universal protocol recommendations.   The above lesion was treated with cryotherapy with freeze thaw freeze technique with 2-3 mm surround freeze for a total of 3 cycles.    Current Medications:   Current Outpatient Medications   Medication Sig Dispense Refill    blood sugar diagnostic Strp Check blood sugar daily and as needed for symptoms of uncontrolled diabetes 100 each 3    blood-glucose meter kit Use as instructed 1  each 0    cholecalciferol, vitamin D3, 125 mcg (5,000 unit) capsule Take 1 capsule (5,000 Units total) by mouth every morning. 90 capsule 3    clonazePAM (KLONOPIN) 0.5 MG tablet Take 0.5 mg by mouth 3 (three) times daily as needed.      dulaglutide (TRULICITY) 3 mg/0.5 mL pen injector Inject 3 mg into the skin every 7 days. 4 pen 11    DULoxetine (CYMBALTA) 60 MG capsule Take 60 mg by mouth once daily.      metFORMIN (GLUCOPHAGE-XR) 500 MG ER 24hr tablet Take 4 tablets (2,000 mg total) by mouth with lunch. 120 tablet 2    rosuvastatin (CRESTOR) 20 MG tablet Take 1 tablet (20 mg total) by mouth once daily. 90 tablet 3    sodium,potassium,mag sulfates (SUPREP BOWEL PREP KIT) 17.5-3.13-1.6 gram SolR PLEASE SEE ATTACHED FOR DETAILED DIRECTIONS      triamcinolone acetonide 0.1% (KENALOG) 0.1 % ointment Apply 1 application topically 3 (three) times daily as needed.       No current facility-administered medications for this visit.       Assessment:   1. Dermatofibroma        Plan:    -cryo was performed today   -after care instructions provided     Return to clinic in 1 month, or sooner if needed.     Magdalena Villa M.D.  [facility]

## 2023-07-05 ENCOUNTER — OFFICE VISIT (OUTPATIENT)
Dept: FAMILY MEDICINE | Facility: CLINIC | Age: 48
End: 2023-07-05
Payer: COMMERCIAL

## 2023-07-05 VITALS
WEIGHT: 315 LBS | RESPIRATION RATE: 17 BRPM | OXYGEN SATURATION: 100 % | TEMPERATURE: 99 F | DIASTOLIC BLOOD PRESSURE: 75 MMHG | HEIGHT: 71 IN | BODY MASS INDEX: 44.1 KG/M2 | HEART RATE: 77 BPM | SYSTOLIC BLOOD PRESSURE: 111 MMHG

## 2023-07-05 DIAGNOSIS — G47.33 OBSTRUCTIVE SLEEP APNEA SYNDROME: ICD-10-CM

## 2023-07-05 DIAGNOSIS — E11.9 TYPE 2 DIABETES MELLITUS WITHOUT COMPLICATION, WITHOUT LONG-TERM CURRENT USE OF INSULIN: Primary | ICD-10-CM

## 2023-07-05 DIAGNOSIS — E78.2 HYPERLIPIDEMIA, MIXED: ICD-10-CM

## 2023-07-05 PROCEDURE — 99215 OFFICE O/P EST HI 40 MIN: CPT | Mod: PBBFAC | Performed by: FAMILY MEDICINE

## 2023-07-05 RX ORDER — ROSUVASTATIN CALCIUM 20 MG/1
20 TABLET, COATED ORAL DAILY
Qty: 90 TABLET | Refills: 3 | Status: SHIPPED | OUTPATIENT
Start: 2023-07-05 | End: 2024-02-28 | Stop reason: SDUPTHER

## 2023-07-05 NOTE — PROGRESS NOTES
Subjective:       Patient ID: Kaiser Herron Jr. is a 47 y.o. male.    Chief Complaint: dermatofibroma and Diabetes  HPI  47-year-old established patient with history of diabetes mellitus, elevated BMI, CONCEPCIÓN on CPAP, severe allergic rhinitis and tinnitus presents for follow-up of his chronic medical conditions.  His appointment was actually scheduled for July 26, 2023 but he came today to see Dr. Freed in minor surgery and she was not here, so we flipped appointments.    Recurrent sinusitis; Intermittent KB/Recent OE :  NO Recent complaints  Never did CT sinuses due to cost   Diabetes mellitus Type 2:   Date         A1c      11/17/2021        6.4%  06/13/22        7.4% on metformin only   09/21/2022             6.4% after adding Trulicity  12/21/2022             7.2% Increased Trulicity to 3.0 mg weekly 2/2023 04/26/2023             7.0%  Discussed increasing to 4.5 mg weekly but defers as he is losing weight and feels missing metformin doses may be affecting his A1c.  Transitioned to extended release metformin to enhance compliance before increasing dose of Trulicity at patient's request.  Current Meds:  Metformin, Trulicity 3 mg consistently since 2/2023;   Adherent to current treatment plan except sometimes forgets p.m. dose of metformin when schedule inconsistent  Diet/Exercise:    Has not been pursuing healthier diet see had previously.    Very active in his job but not counting steps or  aware of how much exercising he is doing.   Elevated BMI:   BMI WEIGHT     Date   51.5                            06/28/2021   Maximum   49.71     363 lb           05/18/2022                      361 lb           12/21/2022                359 lb            02/08/2023  48.68     349 lb            04/26/2023  49.00     350 lb            07/05/2023  Overall 12.5 pound reduction in weight.    MedicAlert identification: None  Hypoglycemia : No episodes; no confusion, dizziness, headaches, hunger, mood changes,  nervousness/anxiousness, pallor, seizures, sleepiness, speech difficulty, sweats or tremors. no blackouts, no hospitalization, no nocturnal hypoglycemia, no required assistance and no required glucagon injection  DM ROS: + polydipsia- improved ; pertinent negatives for diabetes include no blurred vision, no chest pain, no fatigue, no foot paresthesias, no foot ulcerations, no polyphagia, no polyuria, no visual change, no weakness and no weight loss.   Diabetic comobidities/complications: no autonomic neuropathy, CVA, heart disease, impotence, nephropathy, peripheral neuropathy, PVD or retinopathy.   Risk factors for coronary artery disease : obesity, sedentary lifestyle, DM male sex. compliant with treatment most of the time. He has not had a previous visit with a dietitian. . He monitors blood glucose at home 1-2 x per week. He monitors urine at home 1-2 x per day. Eye exam is current.   Tinnitus/severe allergic rhinitis:   Seen by ENT -deferred MRI to evaluate a for 8th nerve because he had not met deductible Continues to have tinnitus /right ear pressure discomfort  CONCEPCIÓN: Compliant with  CPAP 6-8 hours per night and wakes feeling well rested.   Had virtual appointment with [facility] in May to discuss new machine appointment and details have been scanned to his chart and were reviewed by me   HM  Colonoscopy single benign polyp with follow up 5 yrs- Patient says 10 per nurse who called with pathology report but he also does not want to ever do it again because of his experience.Completed colonoscopy with great difficulty because liquid SUPREP didn't work    Review of Systems   Constitutional:  Negative for fatigue and fever.   Respiratory:  Negative for shortness of breath.    Cardiovascular:  Negative for chest pain, palpitations and leg swelling.   Neurological:  Positive for numbness (Occasional left arm numbness or tingling left arm when sleeping on it).          Objective:      Vitals:    07/05/23 1033   BP:  "111/75   Pulse: 77   Resp: 17   Temp: 98.5 °F (36.9 °C)   /75 (BP Location: Left arm, Patient Position: Sitting, BP Method: Large (Automatic))   Pulse 77   Temp 98.5 °F (36.9 °C) (Oral)   Resp 17   Ht 5' 10.87" (1.8 m)   Wt (!) 158.8 kg (350 lb)   SpO2 100%   BMI 49.00 kg/m²       Physical Exam  Constitutional:       Appearance: He is obese.   Pulmonary:      Effort: Pulmonary effort is normal. No respiratory distress.   Neurological:      Mental Status: He is alert and oriented to person, place, and time.      Gait: Gait normal.   Psychiatric:         Mood and Affect: Mood normal.         Behavior: Behavior normal.         Thought Content: Thought content normal.         Judgment: Judgment normal.         Assessment/Plan:  Type 2 diabetes mellitus without complication, without long-term current use of insulin  -     Comprehensive Metabolic Panel in 1 month  -     Hemoglobin A1C in 1 month  -     refill metformin extended release 2000 mg with lunch and Trulicity 3 mg weekly   -     Discussed increasing Trulicity to 4.5; desires to continue current dose for now until A1c result available for review  Hyperlipidemia, mixed  -  refill   rosuvastatin (CRESTOR) 20 MG tablet; Take 1 tablet (20 mg total) by mouth once daily.  Dispense: 90 tablet; Refill: 3    Obstructive sleep apnea syndrome         -   CPAP 6-8 hours per night     Patient to call for labs in 1 month  Follow up in about 3 months (around 10/5/2023).           "

## 2023-07-19 ENCOUNTER — OFFICE VISIT (OUTPATIENT)
Dept: FAMILY MEDICINE | Facility: CLINIC | Age: 48
End: 2023-07-19
Payer: COMMERCIAL

## 2023-07-19 VITALS
BODY MASS INDEX: 44.1 KG/M2 | WEIGHT: 315 LBS | RESPIRATION RATE: 18 BRPM | DIASTOLIC BLOOD PRESSURE: 83 MMHG | HEIGHT: 71 IN | OXYGEN SATURATION: 98 % | HEART RATE: 79 BPM | TEMPERATURE: 99 F | SYSTOLIC BLOOD PRESSURE: 124 MMHG

## 2023-07-19 DIAGNOSIS — D23.9 DERMATOFIBROMA: Primary | ICD-10-CM

## 2023-07-19 PROCEDURE — 11900 INJECT SKIN LESIONS </W 7: CPT | Mod: PBBFAC,59 | Performed by: FAMILY MEDICINE

## 2023-07-19 PROCEDURE — 96372 THER/PROPH/DIAG INJ SC/IM: CPT | Mod: PBBFAC,59

## 2023-07-19 PROCEDURE — 17110 DESTRUCTION B9 LES UP TO 14: CPT | Mod: PBBFAC | Performed by: FAMILY MEDICINE

## 2023-07-19 PROCEDURE — 99214 OFFICE O/P EST MOD 30 MIN: CPT | Mod: 25,PBBFAC | Performed by: FAMILY MEDICINE

## 2023-07-19 RX ADMIN — TRIAMCINOLONE ACETONIDE 10 MG: 10 INJECTION, SUSPENSION INTRA-ARTICULAR; INTRALESIONAL at 02:07

## 2023-07-19 NOTE — PROGRESS NOTES
Subjective:       Patient ID: Kaiser Herron Jr. is a 47 y.o. male.    Chief Complaint: Dermatofibroma    HPI  48 yo follows up in minor surgery for a dermatofibroma on his left 4th digit. Pt has had cryotherapy for this lesion and noted that it is smaller but still present. No problems with previous treatment. Would like to continue to treat lesion.    Review of Systems  As per HPI         Objective:      Vitals:    07/19/23 1332   BP: 124/83   Pulse: 79   Resp: 18   Temp: 98.7 °F (37.1 °C)       Physical Exam    Gen: alert, no acute distress  Skin: left 4th finger- approximately 1 cm round, firm, pink lesion, not raised any longer  Psych: cooperative, appropriate mood and affect      Procedure Note:  Procedure: cryotherapy  Performed by: Lalitha Freed MD  Consent: signed consent obtained after discussion of risks, benefits, and alternative treatments  Description: Liquid nitrogen used for cryotherapy. Tip held 1 cm from lesion and sprayed in freeze-thaw cycle.   The patient tolerated the procedure well with no complications.     Procedure Note:  Procedure: intralesional kenalog injection  Performed by: Lalitha Freed MD  Consent: signed consent obtained after discussion of risks, benefits, and alternative treatments  Description: Area cleaned with alcohol. 0.5mL of kenalog 10 injected into lesion. EBL <1mL.  The patient tolerated the procedure well with no complications.       Assessment/Plan:  Dermatofibroma    Other orders  -     triamcinolone acetonide injection 10 mg    - cryo and intralesional steroid injection today  - Post care instructions and return precautions discussed.   - pt has an appointment with Dr Ni next month. Can repeat cryo in 1 month if needed and follow up in minor surgery after for repeat cryo and/or intralesional injection if needed.

## 2023-08-13 DIAGNOSIS — E11.9 TYPE 2 DIABETES MELLITUS WITHOUT COMPLICATION, WITHOUT LONG-TERM CURRENT USE OF INSULIN: ICD-10-CM

## 2023-08-14 RX ORDER — METFORMIN HYDROCHLORIDE 500 MG/1
TABLET, EXTENDED RELEASE ORAL
Qty: 360 TABLET | Refills: 1 | Status: SHIPPED | OUTPATIENT
Start: 2023-08-14 | End: 2023-09-01 | Stop reason: SDUPTHER

## 2023-08-23 ENCOUNTER — CLINICAL SUPPORT (OUTPATIENT)
Dept: FAMILY MEDICINE | Facility: CLINIC | Age: 48
End: 2023-08-23
Payer: COMMERCIAL

## 2023-08-23 ENCOUNTER — OFFICE VISIT (OUTPATIENT)
Dept: FAMILY MEDICINE | Facility: CLINIC | Age: 48
End: 2023-08-23
Payer: COMMERCIAL

## 2023-08-23 VITALS
SYSTOLIC BLOOD PRESSURE: 108 MMHG | DIASTOLIC BLOOD PRESSURE: 74 MMHG | OXYGEN SATURATION: 97 % | HEIGHT: 70 IN | TEMPERATURE: 98 F | BODY MASS INDEX: 45.1 KG/M2 | RESPIRATION RATE: 18 BRPM | HEART RATE: 74 BPM | WEIGHT: 315 LBS

## 2023-08-23 DIAGNOSIS — E11.9 TYPE 2 DIABETES MELLITUS WITHOUT COMPLICATION, WITHOUT LONG-TERM CURRENT USE OF INSULIN: ICD-10-CM

## 2023-08-23 DIAGNOSIS — E78.2 HYPERLIPIDEMIA, MIXED: ICD-10-CM

## 2023-08-23 DIAGNOSIS — E11.9 TYPE 2 DIABETES MELLITUS WITHOUT COMPLICATION, WITHOUT LONG-TERM CURRENT USE OF INSULIN: Primary | ICD-10-CM

## 2023-08-23 DIAGNOSIS — D23.9 DERMATOFIBROMA: ICD-10-CM

## 2023-08-23 LAB
ALBUMIN SERPL-MCNC: 3.9 G/DL (ref 3.5–5)
ALBUMIN/GLOB SERPL: 1 RATIO (ref 1.1–2)
ALP SERPL-CCNC: 96 UNIT/L (ref 40–150)
ALT SERPL-CCNC: 30 UNIT/L (ref 0–55)
AST SERPL-CCNC: 18 UNIT/L (ref 5–34)
BILIRUB SERPL-MCNC: 0.5 MG/DL
BUN SERPL-MCNC: 12.7 MG/DL (ref 8.9–20.6)
CALCIUM SERPL-MCNC: 10.4 MG/DL (ref 8.4–10.2)
CHLORIDE SERPL-SCNC: 104 MMOL/L (ref 98–107)
CO2 SERPL-SCNC: 28 MMOL/L (ref 22–29)
CREAT SERPL-MCNC: 1.08 MG/DL (ref 0.73–1.18)
EST. AVERAGE GLUCOSE BLD GHB EST-MCNC: 139.9 MG/DL
GFR SERPLBLD CREATININE-BSD FMLA CKD-EPI: >60 MLS/MIN/1.73/M2
GLOBULIN SER-MCNC: 4 GM/DL (ref 2.4–3.5)
GLUCOSE SERPL-MCNC: 142 MG/DL (ref 74–100)
HBA1C MFR BLD: 6.5 %
POTASSIUM SERPL-SCNC: 4.8 MMOL/L (ref 3.5–5.1)
PROT SERPL-MCNC: 7.9 GM/DL (ref 6.4–8.3)
SODIUM SERPL-SCNC: 141 MMOL/L (ref 136–145)

## 2023-08-23 PROCEDURE — 92228 IMG RTA DETC/MNTR DS PHY/QHP: CPT | Mod: PBBFAC

## 2023-08-23 PROCEDURE — 36415 COLL VENOUS BLD VENIPUNCTURE: CPT | Performed by: FAMILY MEDICINE

## 2023-08-23 PROCEDURE — 99214 OFFICE O/P EST MOD 30 MIN: CPT | Mod: PBBFAC | Performed by: FAMILY MEDICINE

## 2023-08-23 PROCEDURE — 80053 COMPREHEN METABOLIC PANEL: CPT | Performed by: FAMILY MEDICINE

## 2023-08-23 PROCEDURE — 83036 HEMOGLOBIN GLYCOSYLATED A1C: CPT | Performed by: FAMILY MEDICINE

## 2023-08-23 NOTE — PROGRESS NOTES
Subjective:       Patient ID: Kaiser Herron Jr. is a 47 y.o. male.    Chief Complaint: Follow-up and Diabetes    HPI  Ashish presents today for follow-up his diabetes, mixed hyperlipidemia and cryotherapy/corticosteroid injection of recent lesion on his finger by Dr. Freed. Patient reports that the lesion treated with cryo/CSI by Dr. Freed has decreased in size and flattened out.  He is very satisfied with the result.    Diabetes mellitus Type 2:   Date                        A1c      11/17/2021             6.4%  06/13/22                 7.4% on metformin only   09/21/2022             6.4% after adding Trulicity  12/21/2022             7.2% Increased Trulicity to 3.0 mg weekly 2/2023 04/26/2023             7.0%  08/23/2023             6.5%  Considered increasing to 4.5 mg weekly but defers as he is losing weight   Current Meds:  Metformin, Trulicity 3 mg consistently since 2/2023;   Adherent to current treatment plan   Diet/Exercise:    Has not been pursuing healthier diet see had previously.    Very active in his job but not counting steps or  aware of how much exercising he is doing.   Elevated BMI:   BMI      WEIGHT             Date   51.5                            06/28/2021   Maximum   49.71     363 lb           05/18/2022                      361 lb           12/21/2022                359 lb            02/08/2023  48.68     349 lb            04/26/2023  49.00     350 lb            07/05/2023  48.84     340 lb 08/23/2023  Overall 23 pound reduction in weight.    MedicAlert identification: None  Hypoglycemia : No episodes; no confusion, dizziness, headaches, hunger, mood changes, nervousness/anxiousness, pallor, seizures, sleepiness, speech difficulty, sweats or tremors. no blackouts, no hospitalization, no nocturnal hypoglycemia, no required assistance and no required glucagon injection  DM ROS: + polydipsia- improved ; pertinent negatives for diabetes include no blurred vision, no chest pain, no fatigue,  "no foot paresthesias, no foot ulcerations, no polyphagia, no polyuria, no visual change, no weakness and no weight loss.   Diabetic comobidities/complications: no autonomic neuropathy, CVA, heart disease, impotence, nephropathy, peripheral neuropathy, PVD or retinopathy.   Risk factors for coronary artery disease : obesity, sedentary lifestyle, DM male sex. compliant with treatment most of the time. He has not had a previous visit with a dietitian. . He monitors blood glucose at home 1-2 x per week. He monitors urine at home 1-2 x per day. Eye exam is current.   Tinnitus/severe allergic rhinitis:   Seen by ENT -deferred MRI to evaluate a for 8th nerve because he had not met deductible Continues to have tinnitus /right ear pressure discomfort  CONCEPCIÓN: Compliant with  CPAP 6-8 hours per night and wakes feeling well rested.   Recurrent sinusitis; Intermittent KB/Recent OE :  NO Recent complaints  Never did CT sinuses due to cost   MEDS  Rosuvastatin 20 mg daily  Metformin extended release 4 tablets daily with lunch  Cymbalta 60 mg daily  Clonazepam 0.5 mg t.i.d. p.r.n.  Cholecalciferol 5000 units q.a.m.  Dulaglutide 3 mg every 7 days  Triamcinolone CNI ointment 0.1% as needed  HM  Colonoscopy single benign polyp with follow up 5 yrs- Patient says 10 per nurse who called with pathology report but he also does not want to ever do it again because of his experience.  Completed colonoscopy with great difficulty because liquid SUPREP didn't work  Review of Systems  See HPI       Objective:      Vitals:    08/23/23 0801   BP: 108/74   Pulse: 74   Resp: 18   Temp: 98.2 °F (36.8 °C)   /74 (BP Location: Left arm, Patient Position: Sitting, BP Method: Large (Automatic))   Pulse 74   Temp 98.2 °F (36.8 °C) (Oral)   Resp 18   Ht 5' 10" (1.778 m)   Wt (!) 154.4 kg (340 lb 6.4 oz)   SpO2 97%   BMI 48.84 kg/m²       Physical Exam  Constitutional:       General: He is not in acute distress.     Appearance: He is obese. He " is not ill-appearing, toxic-appearing or diaphoretic.   Cardiovascular:      Rate and Rhythm: Normal rate and regular rhythm.      Pulses:           Dorsalis pedis pulses are 2+ on the right side and 2+ on the left side.        Posterior tibial pulses are 1+ on the right side and 1+ on the left side.      Heart sounds: No murmur heard.     No friction rub. No gallop.   Pulmonary:      Effort: Pulmonary effort is normal. No respiratory distress.      Breath sounds: Normal breath sounds. No stridor. No wheezing, rhonchi or rales.   Musculoskeletal:      Right foot: Normal range of motion. No deformity, bunion, Charcot foot, foot drop or prominent metatarsal heads.      Left foot: Normal range of motion. No deformity, bunion, Charcot foot, foot drop or prominent metatarsal heads.   Feet:      Right foot:      Protective Sensation: 10 sites tested.  10 sites sensed.      Skin integrity: Skin integrity normal.      Toenail Condition: Right toenails are normal.      Left foot:      Protective Sensation: 10 sites tested.  10 sites sensed.      Skin integrity: Skin integrity normal.      Toenail Condition: Left toenails are normal.   Neurological:      Mental Status: He is alert and oriented to person, place, and time.   Psychiatric:         Mood and Affect: Mood normal.         Behavior: Behavior normal.         Thought Content: Thought content normal.         Judgment: Judgment normal.       Dermatofibrosarcoma status post CSI/cryo          Assessment/Plan:  Type 2 diabetes mellitus without complication, without long-term current use of insulin  -     Hemoglobin A1C 6.5%-improved  -     Comprehensive Metabolic Panel-slightly elevated calcium 10.4  -     Diabetic Eye Screening Photo-interpretation pending  -     given name of ophthalmologist/optometrist to establish care  -     DFE completed today  -     Prevnar 20 at next visit or  before  -     lipid profile, A1c, urine microalbumin creatinine ratio to be ordered at next  visit  -     continue Trulicity and metformin extended release    Hyperlipidemia, mixed         -continue rosuvastatin  BMI 45.0-49.9, adult         -congratulated on weight loss and encouraged to ensure he eats 3 meals a day with good sources of protein at each meal.  Recommended avoiding processed foods/simple carbohydrates  Dermatofibroma           - may consider repeat intralesional corticosteroid injection if necessary       Follow up in about 3 months (around 11/23/2023).

## 2023-08-23 NOTE — PROGRESS NOTES
Kaiser Herron  is a 47 y.o. male here for a diabetic eye screening with non-dilated fundus photos per Dr. Ni.    Patient cooperative?: Yes  Small pupils?: No  Last eye exam: Unknown    For exam results, see Encounter Report.

## 2023-09-01 DIAGNOSIS — E11.9 TYPE 2 DIABETES MELLITUS WITHOUT COMPLICATION, WITHOUT LONG-TERM CURRENT USE OF INSULIN: ICD-10-CM

## 2023-09-01 RX ORDER — METFORMIN HYDROCHLORIDE 500 MG/1
TABLET, EXTENDED RELEASE ORAL
Qty: 360 TABLET | Refills: 1 | Status: SHIPPED | OUTPATIENT
Start: 2023-09-01 | End: 2023-12-13

## 2023-11-22 ENCOUNTER — OFFICE VISIT (OUTPATIENT)
Dept: FAMILY MEDICINE | Facility: CLINIC | Age: 48
End: 2023-11-22
Payer: COMMERCIAL

## 2023-11-22 VITALS
OXYGEN SATURATION: 97 % | RESPIRATION RATE: 18 BRPM | HEART RATE: 85 BPM | WEIGHT: 315 LBS | SYSTOLIC BLOOD PRESSURE: 121 MMHG | DIASTOLIC BLOOD PRESSURE: 83 MMHG | BODY MASS INDEX: 45.1 KG/M2 | HEIGHT: 70 IN | TEMPERATURE: 98 F

## 2023-11-22 DIAGNOSIS — E66.01 SEVERE OBESITY (BMI >= 40): ICD-10-CM

## 2023-11-22 DIAGNOSIS — G47.33 OBSTRUCTIVE SLEEP APNEA SYNDROME: ICD-10-CM

## 2023-11-22 DIAGNOSIS — E78.2 HYPERLIPIDEMIA, MIXED: ICD-10-CM

## 2023-11-22 DIAGNOSIS — Z23 NEED FOR PNEUMOCOCCAL 20-VALENT CONJUGATE VACCINATION: ICD-10-CM

## 2023-11-22 DIAGNOSIS — E11.9 TYPE 2 DIABETES MELLITUS WITHOUT COMPLICATION, WITHOUT LONG-TERM CURRENT USE OF INSULIN: Primary | ICD-10-CM

## 2023-11-22 LAB
ALBUMIN SERPL-MCNC: 3.8 G/DL (ref 3.5–5)
ALBUMIN/GLOB SERPL: 0.9 RATIO (ref 1.1–2)
ALP SERPL-CCNC: 95 UNIT/L (ref 40–150)
ALT SERPL-CCNC: 33 UNIT/L (ref 0–55)
AST SERPL-CCNC: 23 UNIT/L (ref 5–34)
BILIRUB SERPL-MCNC: 0.5 MG/DL
BUN SERPL-MCNC: 13.5 MG/DL (ref 8.9–20.6)
CALCIUM SERPL-MCNC: 9.9 MG/DL (ref 8.4–10.2)
CHLORIDE SERPL-SCNC: 103 MMOL/L (ref 98–107)
CHOLEST SERPL-MCNC: 195 MG/DL
CHOLEST/HDLC SERPL: 4 {RATIO} (ref 0–5)
CO2 SERPL-SCNC: 27 MMOL/L (ref 22–29)
CREAT SERPL-MCNC: 1.01 MG/DL (ref 0.73–1.18)
CREAT UR-MCNC: 211 MG/DL (ref 63–166)
EST. AVERAGE GLUCOSE BLD GHB EST-MCNC: 154.2 MG/DL
GFR SERPLBLD CREATININE-BSD FMLA CKD-EPI: >60 MLS/MIN/1.73/M2
GLOBULIN SER-MCNC: 4.1 GM/DL (ref 2.4–3.5)
GLUCOSE SERPL-MCNC: 110 MG/DL (ref 74–100)
HBA1C MFR BLD: 7 %
HDLC SERPL-MCNC: 44 MG/DL (ref 35–60)
LDLC SERPL CALC-MCNC: 127 MG/DL (ref 50–140)
MICROALBUMIN UR-MCNC: 23.9 UG/ML
MICROALBUMIN/CREAT RATIO PNL UR: 11.3 MG/GM CR (ref 0–30)
POTASSIUM SERPL-SCNC: 4.2 MMOL/L (ref 3.5–5.1)
PROT SERPL-MCNC: 7.9 GM/DL (ref 6.4–8.3)
SODIUM SERPL-SCNC: 141 MMOL/L (ref 136–145)
TRIGL SERPL-MCNC: 122 MG/DL (ref 34–140)
TSH SERPL-ACNC: 1.96 UIU/ML (ref 0.35–4.94)
VLDLC SERPL CALC-MCNC: 24 MG/DL

## 2023-11-22 PROCEDURE — 36415 COLL VENOUS BLD VENIPUNCTURE: CPT | Performed by: FAMILY MEDICINE

## 2023-11-22 PROCEDURE — 83036 HEMOGLOBIN GLYCOSYLATED A1C: CPT | Performed by: FAMILY MEDICINE

## 2023-11-22 PROCEDURE — 90677 PCV20 VACCINE IM: CPT | Mod: PBBFAC

## 2023-11-22 PROCEDURE — 80061 LIPID PANEL: CPT | Performed by: FAMILY MEDICINE

## 2023-11-22 PROCEDURE — 82043 UR ALBUMIN QUANTITATIVE: CPT | Performed by: FAMILY MEDICINE

## 2023-11-22 PROCEDURE — 84443 ASSAY THYROID STIM HORMONE: CPT | Performed by: FAMILY MEDICINE

## 2023-11-22 PROCEDURE — 90471 IMMUNIZATION ADMIN: CPT | Mod: PBBFAC

## 2023-11-22 PROCEDURE — 80053 COMPREHEN METABOLIC PANEL: CPT | Performed by: FAMILY MEDICINE

## 2023-11-22 PROCEDURE — 99214 OFFICE O/P EST MOD 30 MIN: CPT | Mod: PBBFAC | Performed by: FAMILY MEDICINE

## 2023-11-22 RX ORDER — BREXPIPRAZOLE 0.5 MG-1MG
0.5 KIT ORAL DAILY
COMMUNITY
End: 2024-02-28

## 2023-11-22 RX ADMIN — PNEUMOCOCCAL 20-VALENT CONJUGATE VACCINE 0.5 ML
2.2; 2.2; 2.2; 2.2; 2.2; 2.2; 2.2; 2.2; 2.2; 2.2; 2.2; 2.2; 2.2; 2.2; 2.2; 2.2; 4.4; 2.2; 2.2; 2.2 INJECTION, SUSPENSION INTRAMUSCULAR at 10:11

## 2023-11-22 NOTE — PROGRESS NOTES
Subjective:       Patient ID: Kaiser Herron Jr. is a 47 y.o. male.    Chief Complaint: Follow-up and Diabetes  HPI    Ashish presents today for follow-up his diabetes, mixed hyperlipidemia, CONCEPCIÓN , and elevated BMI.    Diabetes mellitus Type 2:   Date                        A1c      11/17/2021             6.4%  06/13/22                 7.4% on metformin only   09/21/2022             6.4% after adding Trulicity  12/21/2022             7.2% Increased Trulicity to 3.0 mg weekly 2/2023 04/26/2023             7.0%  08/23/2023             6.5%  Considered increasing to 4.5 mg weekly but deferred as he was losing weight   Current Meds:  Metformin, Trulicity 3 mg consistently since 2/2023;   Adherent to current treatment plan   Diet/Exercise:    Has not been pursuing healthier diet see had previously.    Very active in his job but not counting steps or  aware of how much exercising he is doing.  Doing some limited exercise    Elevated BMI:   BMI      WEIGHT             Date   51.5                            06/28/2021   Maximum   49.71     363 lb           05/18/2022                      361 lb           12/21/2022                359 lb            02/08/2023  48.68     349 lb            04/26/2023  49.00     350 lb            07/05/2023  48.84     340 lb            08/23/2023  48.56     338 lb  11/22/2023  Overall 25 pound reduction in weight.    MedicAlert identification: None  Hypoglycemia : No episodes; no confusion, dizziness, headaches, hunger, mood changes, nervousness/anxiousness, pallor, seizures, sleepiness, speech difficulty, sweats or tremors. no blackouts, no hospitalization, no nocturnal hypoglycemia, no required assistance and no required glucagon injection  DM ROS: + polydipsia- improved ; pertinent negatives for diabetes include no blurred vision, no chest pain, no fatigue, no foot paresthesias, no foot ulcerations, no polyphagia, no polyuria, no visual change, no weakness and no weight loss.   Diabetic  "comobidities/complications: no autonomic neuropathy, CVA, heart disease, impotence, nephropathy, peripheral neuropathy, PVD or retinopathy.   Risk factors for coronary artery disease : obesity, sedentary lifestyle, DM male sex. compliant with treatment most of the time. He has not had a previous visit with a dietitian. . He monitors blood glucose at home 1-2 x per week. He monitors urine at home 1-2 x per day. Eye exam is current.   Mixed Hyperlipidemia   Adherent to Crestor but pharmacy had run out of Crestor 3 weeks prior to the lab assessment   Patient was not fasting at the time of this lipid assessment  Tinnitus/severe allergic rhinitis:   Seen by ENT -deferred MRI to evaluate a for 8th nerve because he had not met deductible Continues to have tinnitus /right ear pressure discomfort  CONCEPCIÓN: Compliant with  CPAP 6-8 hours per night and wakes feeling well rested.   Recurrent sinusitis; Intermittent KB/Recent OE :  NO Recent complaints  Never did CT sinuses due to cost   MEDS  Rosuvastatin 20 mg daily  Metformin extended release 4 tablets daily with lunch  Cymbalta 60 mg daily  Clonazepam 0.5 mg t.i.d. p.r.n.  Brexpiprazole (Rexulti)  0.5 mg daily  - in titration phase   Cholecalciferol 5000 units q.a.m.  Dulaglutide 3 mg every 7 days  Triamcinolone CNI ointment 0.1% as needed  HM  Colonoscopy 3/30/2023 single benign polyp with follow up 5 yrs- Patient says 10 per nurse who called with pathology report but he also does not want to ever do it again because of his experience.  Completed colonoscopy with great difficulty because liquid SUPREP didn't work    Review of Systems  See HPI        Objective:      Vitals:    11/22/23 0859   BP: 121/83   Pulse: 85   Resp: 18   Temp: 98.4 °F (36.9 °C)   /83 (BP Location: Left arm, Patient Position: Sitting, BP Method: Large (Automatic))   Pulse 85   Temp 98.4 °F (36.9 °C) (Oral)   Resp 18   Ht 5' 10" (1.778 m)   Wt (!) 153.5 kg (338 lb 6.4 oz)   SpO2 97%   BMI " 48.56 kg/m²       Physical Exam  Constitutional:       General: He is not in acute distress.     Appearance: He is not ill-appearing or toxic-appearing.   Cardiovascular:      Rate and Rhythm: Normal rate and regular rhythm.   Pulmonary:      Effort: Pulmonary effort is normal. No respiratory distress.      Breath sounds: No wheezing or rales.   Neurological:      Mental Status: He is alert and oriented to person, place, and time.   Psychiatric:         Mood and Affect: Mood normal.         Behavior: Behavior normal.         Thought Content: Thought content normal.         Judgment: Judgment normal.           Assessment/Plan:  Type 2 diabetes mellitus without complication, without long-term current use of insulin  Congratulated on successful maintenance of weight loss  Prevnar 20 recommended and indication reviewed   Based on current A1c and benefits of continued weight loss, advised patient to consider increase in dulaglutide to 4.5 mg weekly versus change to Mounjaro         -     Comprehensive Metabolic Panel glu 110 Total globulin 4.1 (stable)  -     Microalbumin/Creatinine Ratio 11.3  -     Hemoglobin A1C 7.0 %  -     TSH 1.957  -     pneumoc 20-dk conj-dip cr(PF) (PREVNAR-20 (PF)) injection Syrg 0.5 mL  Hyperlipidemia, mixed  Patient had run out of Crestor 3 weeks prior to the lab assessment   Continue rosuvastatin 20 mg once daily  Repeat lipid panel on meds at next visit   Consider increasing rosuvastatin or addition of Zetia if not at goal  -     Lipid Panel  HDL 44    Obstructive sleep apnea syndrome  Encourage continued compliance with Auto PAP   Need for pneumococcal 20-valent conjugate vaccination  -     pneumoc 20-dk conj-dip cr(PF) (PREVNAR-20 (PF)) injection Syrg 0.5 mL         Follow up in about 3 months (around 2/22/2024).

## 2023-12-04 RX ORDER — DULAGLUTIDE 4.5 MG/.5ML
4.5 INJECTION, SOLUTION SUBCUTANEOUS
Qty: 4 PEN | Refills: 11 | Status: SHIPPED | OUTPATIENT
Start: 2023-12-04 | End: 2024-02-16 | Stop reason: SDUPTHER

## 2023-12-13 DIAGNOSIS — E11.9 TYPE 2 DIABETES MELLITUS WITHOUT COMPLICATION, WITHOUT LONG-TERM CURRENT USE OF INSULIN: ICD-10-CM

## 2023-12-13 RX ORDER — METFORMIN HYDROCHLORIDE 500 MG/1
TABLET, EXTENDED RELEASE ORAL
Qty: 360 TABLET | Refills: 1 | Status: SHIPPED | OUTPATIENT
Start: 2023-12-13

## 2023-12-20 LAB
LEFT EYE DM RETINOPATHY: NEGATIVE
LEFT EYE DM RETINOPATHY: NORMAL
RIGHT EYE DM RETINOPATHY: NEGATIVE
RIGHT EYE DM RETINOPATHY: NORMAL

## 2024-02-16 DIAGNOSIS — E11.9 TYPE 2 DIABETES MELLITUS WITHOUT COMPLICATION, WITHOUT LONG-TERM CURRENT USE OF INSULIN: ICD-10-CM

## 2024-02-16 RX ORDER — DULAGLUTIDE 4.5 MG/.5ML
4.5 INJECTION, SOLUTION SUBCUTANEOUS
Qty: 4 PEN | Refills: 11 | Status: SHIPPED | OUTPATIENT
Start: 2024-02-16 | End: 2024-02-28 | Stop reason: SDUPTHER

## 2024-02-16 RX ORDER — AMOXICILLIN AND CLAVULANATE POTASSIUM 875; 125 MG/1; MG/1
1 TABLET, FILM COATED ORAL 2 TIMES DAILY
COMMUNITY
Start: 2024-02-01 | End: 2024-02-28 | Stop reason: ALTCHOICE

## 2024-02-16 RX ORDER — PREDNISONE 20 MG/1
20 TABLET ORAL 2 TIMES DAILY
COMMUNITY
Start: 2024-02-01 | End: 2024-02-28 | Stop reason: ALTCHOICE

## 2024-02-28 ENCOUNTER — OFFICE VISIT (OUTPATIENT)
Dept: FAMILY MEDICINE | Facility: CLINIC | Age: 49
End: 2024-02-28
Payer: COMMERCIAL

## 2024-02-28 VITALS
OXYGEN SATURATION: 96 % | BODY MASS INDEX: 45.1 KG/M2 | SYSTOLIC BLOOD PRESSURE: 121 MMHG | HEIGHT: 70 IN | TEMPERATURE: 98 F | WEIGHT: 315 LBS | RESPIRATION RATE: 18 BRPM | HEART RATE: 71 BPM | DIASTOLIC BLOOD PRESSURE: 80 MMHG

## 2024-02-28 DIAGNOSIS — F41.1 GENERALIZED ANXIETY DISORDER: Primary | ICD-10-CM

## 2024-02-28 DIAGNOSIS — E78.2 HYPERLIPIDEMIA, MIXED: ICD-10-CM

## 2024-02-28 DIAGNOSIS — E66.01 SEVERE OBESITY (BMI >= 40): ICD-10-CM

## 2024-02-28 DIAGNOSIS — E11.9 TYPE 2 DIABETES MELLITUS WITHOUT COMPLICATION, WITHOUT LONG-TERM CURRENT USE OF INSULIN: ICD-10-CM

## 2024-02-28 LAB
ALBUMIN SERPL-MCNC: 3.7 G/DL (ref 3.5–5)
ALBUMIN/GLOB SERPL: 0.9 RATIO (ref 1.1–2)
ALP SERPL-CCNC: 85 UNIT/L (ref 40–150)
ALT SERPL-CCNC: 39 UNIT/L (ref 0–55)
AST SERPL-CCNC: 22 UNIT/L (ref 5–34)
BILIRUB SERPL-MCNC: 0.5 MG/DL
BUN SERPL-MCNC: 12.4 MG/DL (ref 8.9–20.6)
CALCIUM SERPL-MCNC: 9.4 MG/DL (ref 8.4–10.2)
CHLORIDE SERPL-SCNC: 104 MMOL/L (ref 98–107)
CO2 SERPL-SCNC: 27 MMOL/L (ref 22–29)
CREAT SERPL-MCNC: 0.96 MG/DL (ref 0.73–1.18)
EST. AVERAGE GLUCOSE BLD GHB EST-MCNC: 157.1 MG/DL
GFR SERPLBLD CREATININE-BSD FMLA CKD-EPI: >60 MLS/MIN/1.73/M2
GLOBULIN SER-MCNC: 4.3 GM/DL (ref 2.4–3.5)
GLUCOSE SERPL-MCNC: 158 MG/DL (ref 74–100)
HBA1C MFR BLD: 7.1 %
POTASSIUM SERPL-SCNC: 4 MMOL/L (ref 3.5–5.1)
PROT SERPL-MCNC: 8 GM/DL (ref 6.4–8.3)
SODIUM SERPL-SCNC: 140 MMOL/L (ref 136–145)

## 2024-02-28 PROCEDURE — 36415 COLL VENOUS BLD VENIPUNCTURE: CPT | Performed by: FAMILY MEDICINE

## 2024-02-28 PROCEDURE — 80053 COMPREHEN METABOLIC PANEL: CPT | Performed by: FAMILY MEDICINE

## 2024-02-28 PROCEDURE — 83036 HEMOGLOBIN GLYCOSYLATED A1C: CPT | Performed by: FAMILY MEDICINE

## 2024-02-28 PROCEDURE — 99214 OFFICE O/P EST MOD 30 MIN: CPT | Mod: PBBFAC | Performed by: FAMILY MEDICINE

## 2024-02-28 RX ORDER — DULAGLUTIDE 4.5 MG/.5ML
4.5 INJECTION, SOLUTION SUBCUTANEOUS
Qty: 4 PEN | Refills: 11 | Status: SHIPPED | OUTPATIENT
Start: 2024-02-28 | End: 2024-02-28 | Stop reason: SDUPTHER

## 2024-02-28 RX ORDER — DULOXETIN HYDROCHLORIDE 60 MG/1
60 CAPSULE, DELAYED RELEASE ORAL DAILY
Qty: 90 CAPSULE | Refills: 0 | Status: SHIPPED | OUTPATIENT
Start: 2024-02-28 | End: 2024-02-28 | Stop reason: SDUPTHER

## 2024-02-28 RX ORDER — DULAGLUTIDE 4.5 MG/.5ML
4.5 INJECTION, SOLUTION SUBCUTANEOUS
Qty: 4 PEN | Refills: 11 | Status: SHIPPED | OUTPATIENT
Start: 2024-02-28 | End: 2024-05-15 | Stop reason: SINTOL

## 2024-02-28 RX ORDER — ROSUVASTATIN CALCIUM 20 MG/1
20 TABLET, COATED ORAL DAILY
Qty: 90 TABLET | Refills: 3 | Status: SHIPPED | OUTPATIENT
Start: 2024-02-28 | End: 2025-02-27

## 2024-02-28 RX ORDER — DULOXETIN HYDROCHLORIDE 60 MG/1
60 CAPSULE, DELAYED RELEASE ORAL DAILY
Qty: 90 CAPSULE | Refills: 0 | Status: SHIPPED | OUTPATIENT
Start: 2024-02-28 | End: 2024-06-04

## 2024-02-28 RX ORDER — ROSUVASTATIN CALCIUM 20 MG/1
20 TABLET, COATED ORAL DAILY
Qty: 90 TABLET | Refills: 3 | Status: SHIPPED | OUTPATIENT
Start: 2024-02-28 | End: 2024-02-28 | Stop reason: SDUPTHER

## 2024-02-28 NOTE — PROGRESS NOTES
Subjective:       Patient ID: Kaiser Herron Jr. is a 48 y.o. male.    Chief Complaint: Follow-up and Diabetes    HPI  Ashish presents today for follow-up his diabetes, mixed hyperlipidemia, CONCEPCIÓN , and elevated BMI.    Diabetes mellitus Type 2:   Date                        A1c      11/17/2021             6.4%  06/13/22                 7.4% on metformin only   09/21/2022             6.4% after adding Trulicity  12/21/2022             7.2% Increased Trulicity to 3.0 mg weekly 2/2023 04/26/2023             7.0%  08/23/2023             6.5%  2/28/2024               7.1%  Considered increasing to 4.5 mg weekly but deferred as he was losing weight   Current Meds:  Metformin consistently, Trulicity 3.0  mg inconsistently since   Diet/Exercise:    Has not been pursuing healthier diet see had previously.    Very active in his job but not counting steps or  aware of how much exercising he is doing.  Doing some limited exercise    Elevated BMI:   BMI      WEIGHT             Date   51.5                            06/28/2021   Maximum   49.71     363 lb           05/18/2022                      361 lb           12/21/2022                359 lb            02/08/2023  48.68     349 lb            04/26/2023  49.00     350 lb            07/05/2023  48.84     340 lb            08/23/2023  48.56     338 lb            11/22/2023  48.21     336 lb            02/28/2024  Diabetic comobidities/complications: no autonomic neuropathy, CVA, heart disease, impotence, nephropathy, peripheral neuropathy, PVD or retinopathy.   Risk factors for coronary artery disease : obesity, sedentary lifestyle, DM male sex. compliant with treatment most of the time. He does not desire a meeting with dietician. He monitors blood glucose at home 1-2 x per week. He monitors urine at home 1-2 x per day. Eye exam is current.   Mixed Hyperlipidemia   Adherent to Crestor but pharmacy had run out of Crestor 3 weeks prior to the lab assessment   Patient was not  "fasting at the time of this lipid assessment  Tinnitus/severe allergic rhinitis:   Seen by ENT -deferred MRI to evaluate a for 8th nerve because he had not met deductible Continues to have tinnitus /right ear pressure discomfort  CONCEPCIÓN: Compliant with  CPAP 6-8 hours per night and wakes feeling well rested.   Recurrent sinusitis; Intermittent KB/Recent OE :  NO Recent complaints  Never did CT sinuses due to cost   MEDS  Rosuvastatin 20 mg daily  Metformin extended release 4 tablets daily with lunch  Cymbalta 60 mg daily  Clonazepam 0.5 mg t.i.d. p.r.n.  Brexpiprazole (Rexulti)  0.5 mg daily  - took for 2 months; discontinued due to cost  Cholecalciferol 5000 units q.a.m.  Dulaglutide 3 mg every 7 days  Triamcinolone CNI ointment 0.1% as needed  HM  Colonoscopy 3/30/2023 single benign polyp with follow up 5 yrs- Patient says 10 per nurse who called with pathology report but he also does not want to ever do it again because of his experience.  Completed colonoscopy with great difficulty because liquid SUPREP didn't work  Review of Systems  DM ROS: + polydipsia- improved ; pertinent negatives for diabetes include no blurred vision, no chest pain, no fatigue, no foot paresthesias, no foot ulcerations, no polyphagia, no polyuria, no visual change, no weakness and no weight loss.      Hypoglycemia : No confusion, dizziness, headaches, hunger, mood changes, nervousness/anxiousness, pallor, seizures, sleepiness, speech difficulty, sweats or tremors. no blackouts  Objective:      Vitals:    02/28/24 0859   BP: 121/80   Pulse: 71   Resp: 18   Temp: 98.1 °F (36.7 °C)   /80 (BP Location: Right arm, Patient Position: Sitting, BP Method: Large (Automatic))   Pulse 71   Temp 98.1 °F (36.7 °C) (Oral)   Resp 18   Ht 5' 10" (1.778 m)   Wt (!) 152.4 kg (336 lb)   SpO2 96%   BMI 48.21 kg/m²       Physical Exam  Constitutional:       General: He is not in acute distress.     Appearance: He is obese. He is not ill-appearing, " toxic-appearing or diaphoretic.   Cardiovascular:      Rate and Rhythm: Normal rate and regular rhythm.   Pulmonary:      Effort: Pulmonary effort is normal. No respiratory distress.      Breath sounds: No stridor. No wheezing, rhonchi or rales.   Neurological:      Mental Status: He is alert and oriented to person, place, and time.           Assessment/Plan:  Generalized anxiety disorder  Continue Cymbalta 60 mg daily as prescribed by provider  -     DULoxetine (CYMBALTA) 60 MG capsule; Take 1 capsule (60 mg total) by mouth once daily.  Dispense: 90 capsule; Refill: 0    Type 2 diabetes mellitus without complication, without long-term current use of insulin  Severe obesity (BMI >= 40)  BMI 40.0-44.9, adult  Continue Trulicity 4.5 mg every 7 days  Encouraged adherence to healthy diet rich in complex carbohydrates in the form of fresh fruits and vegetables and lean meats with emphasis on portion control and limitation of concentrated sweets  Encouraged attempts at increasing exercise with a starting effort of 10 minutes daily and goal of 150 minutes weekly  -     dulaglutide (TRULICITY) 4.5 mg/0.5 mL pen injector; Inject 4.5 mg into the skin every 7 days.  Dispense: 4 pen ; Refill: 1  Hyperlipidemia, mixed  Refill rosuvastatin  -     rosuvastatin (CRESTOR) 20 MG tablet; Take 1 tablet (20 mg total) by mouth once daily.  Dispense: 90 tablet; Refill: 3  Follow up in about 11 weeks (around 5/15/2024).

## 2024-04-30 ENCOUNTER — TELEPHONE (OUTPATIENT)
Dept: FAMILY MEDICINE | Facility: CLINIC | Age: 49
End: 2024-04-30
Payer: COMMERCIAL

## 2024-04-30 NOTE — TELEPHONE ENCOUNTER
Patient's mom contacted me to report patient has been complaining about constipation x 1 month. His last colonoscopy was 3/30/23 with a single sessile 5 mm polyp of benign appearance identified in the descending colon (hyperplastic polyp) he is currently on Trulicity but has been holding it on and off due to acute constipation. He reports belching/passing gas

## 2024-05-01 ENCOUNTER — HOSPITAL ENCOUNTER (OUTPATIENT)
Dept: RADIOLOGY | Facility: HOSPITAL | Age: 49
Discharge: HOME | End: 2024-05-01
Attending: FAMILY MEDICINE
Payer: COMMERCIAL

## 2024-05-01 ENCOUNTER — OFFICE VISIT (OUTPATIENT)
Dept: FAMILY MEDICINE | Facility: CLINIC | Age: 49
End: 2024-05-01
Payer: COMMERCIAL

## 2024-05-01 VITALS
SYSTOLIC BLOOD PRESSURE: 137 MMHG | OXYGEN SATURATION: 96 % | HEART RATE: 66 BPM | WEIGHT: 315 LBS | TEMPERATURE: 98 F | HEIGHT: 70 IN | DIASTOLIC BLOOD PRESSURE: 89 MMHG | RESPIRATION RATE: 18 BRPM | BODY MASS INDEX: 45.1 KG/M2

## 2024-05-01 DIAGNOSIS — E11.9 TYPE 2 DIABETES MELLITUS WITHOUT COMPLICATION, WITHOUT LONG-TERM CURRENT USE OF INSULIN: ICD-10-CM

## 2024-05-01 DIAGNOSIS — K59.00 CONSTIPATION, UNSPECIFIED CONSTIPATION TYPE: ICD-10-CM

## 2024-05-01 DIAGNOSIS — R10.32 LEFT LOWER QUADRANT PAIN: ICD-10-CM

## 2024-05-01 DIAGNOSIS — R11.2 NAUSEA AND VOMITING, UNSPECIFIED VOMITING TYPE: ICD-10-CM

## 2024-05-01 DIAGNOSIS — K42.9 UMBILICAL HERNIA WITHOUT OBSTRUCTION AND WITHOUT GANGRENE: ICD-10-CM

## 2024-05-01 DIAGNOSIS — R06.00 DYSPNEA, UNSPECIFIED TYPE: ICD-10-CM

## 2024-05-01 DIAGNOSIS — R10.12 LEFT UPPER QUADRANT PAIN: ICD-10-CM

## 2024-05-01 DIAGNOSIS — R10.11 RUQ PAIN: ICD-10-CM

## 2024-05-01 DIAGNOSIS — R10.32 LEFT LOWER QUADRANT PAIN: Primary | ICD-10-CM

## 2024-05-01 DIAGNOSIS — R61 DIAPHORESIS: ICD-10-CM

## 2024-05-01 LAB
ALBUMIN SERPL-MCNC: 3.8 G/DL (ref 3.5–5)
ALBUMIN/GLOB SERPL: 0.9 RATIO (ref 1.1–2)
ALP SERPL-CCNC: 94 UNIT/L (ref 40–150)
ALT SERPL-CCNC: 43 UNIT/L (ref 0–55)
APPEARANCE UR: CLEAR
AST SERPL-CCNC: 28 UNIT/L (ref 5–34)
BACTERIA #/AREA URNS AUTO: ABNORMAL /HPF
BASOPHILS # BLD AUTO: 0.08 X10(3)/MCL
BASOPHILS NFR BLD AUTO: 0.9 %
BILIRUB SERPL-MCNC: 0.6 MG/DL
BILIRUB UR QL STRIP.AUTO: NEGATIVE
BUN SERPL-MCNC: 7.6 MG/DL (ref 8.9–20.6)
CALCIUM SERPL-MCNC: 9.8 MG/DL (ref 8.4–10.2)
CHLORIDE SERPL-SCNC: 104 MMOL/L (ref 98–107)
CO2 SERPL-SCNC: 28 MMOL/L (ref 22–29)
COLOR UR AUTO: ABNORMAL
CREAT SERPL-MCNC: 0.91 MG/DL (ref 0.73–1.18)
EOSINOPHIL # BLD AUTO: 0.38 X10(3)/MCL (ref 0–0.9)
EOSINOPHIL NFR BLD AUTO: 4.1 %
ERYTHROCYTE [DISTWIDTH] IN BLOOD BY AUTOMATED COUNT: 11.8 % (ref 11.5–17)
EST. AVERAGE GLUCOSE BLD GHB EST-MCNC: 159.9 MG/DL
GFR SERPLBLD CREATININE-BSD FMLA CKD-EPI: >60 MLS/MIN/1.73/M2
GLOBULIN SER-MCNC: 4.2 GM/DL (ref 2.4–3.5)
GLUCOSE SERPL-MCNC: 129 MG/DL (ref 74–100)
GLUCOSE UR QL STRIP.AUTO: NORMAL
HBA1C MFR BLD: 7.2 %
HCT VFR BLD AUTO: 43.7 % (ref 42–52)
HGB BLD-MCNC: 14.8 G/DL (ref 14–18)
HYALINE CASTS #/AREA URNS LPF: ABNORMAL /LPF
IMM GRANULOCYTES # BLD AUTO: 0.03 X10(3)/MCL (ref 0–0.04)
IMM GRANULOCYTES NFR BLD AUTO: 0.3 %
KETONES UR QL STRIP.AUTO: NEGATIVE
LEUKOCYTE ESTERASE UR QL STRIP.AUTO: NEGATIVE
LIPASE SERPL-CCNC: 18 U/L
LYMPHOCYTES # BLD AUTO: 2.08 X10(3)/MCL (ref 0.6–4.6)
LYMPHOCYTES NFR BLD AUTO: 22.3 %
MCH RBC QN AUTO: 30.3 PG (ref 27–31)
MCHC RBC AUTO-ENTMCNC: 33.9 G/DL (ref 33–36)
MCV RBC AUTO: 89.4 FL (ref 80–94)
MONOCYTES # BLD AUTO: 0.52 X10(3)/MCL (ref 0.1–1.3)
MONOCYTES NFR BLD AUTO: 5.6 %
MUCOUS THREADS URNS QL MICRO: ABNORMAL /LPF
NEUTROPHILS # BLD AUTO: 6.22 X10(3)/MCL (ref 2.1–9.2)
NEUTROPHILS NFR BLD AUTO: 66.8 %
NITRITE UR QL STRIP.AUTO: NEGATIVE
NRBC BLD AUTO-RTO: 0 %
OHS QRS DURATION: 90 MS
OHS QTC CALCULATION: 444 MS
PH UR STRIP.AUTO: 7.5 [PH]
PLATELET # BLD AUTO: 265 X10(3)/MCL (ref 130–400)
PMV BLD AUTO: 8.5 FL (ref 7.4–10.4)
POTASSIUM SERPL-SCNC: 4.1 MMOL/L (ref 3.5–5.1)
PROT SERPL-MCNC: 8 GM/DL (ref 6.4–8.3)
PROT UR QL STRIP.AUTO: ABNORMAL
RBC # BLD AUTO: 4.89 X10(6)/MCL (ref 4.7–6.1)
RBC #/AREA URNS AUTO: ABNORMAL /HPF
RBC UR QL AUTO: NEGATIVE
SODIUM SERPL-SCNC: 139 MMOL/L (ref 136–145)
SP GR UR STRIP.AUTO: 1.02 (ref 1–1.03)
SQUAMOUS #/AREA URNS LPF: ABNORMAL /HPF
UROBILINOGEN UR STRIP-ACNC: NORMAL
WBC # SPEC AUTO: 9.31 X10(3)/MCL (ref 4.5–11.5)
WBC #/AREA URNS AUTO: ABNORMAL /HPF

## 2024-05-01 PROCEDURE — 74019 RADEX ABDOMEN 2 VIEWS: CPT | Mod: TC

## 2024-05-01 PROCEDURE — 93005 ELECTROCARDIOGRAM TRACING: CPT

## 2024-05-01 PROCEDURE — 83690 ASSAY OF LIPASE: CPT | Performed by: FAMILY MEDICINE

## 2024-05-01 PROCEDURE — 85025 COMPLETE CBC W/AUTO DIFF WBC: CPT | Performed by: FAMILY MEDICINE

## 2024-05-01 PROCEDURE — 80053 COMPREHEN METABOLIC PANEL: CPT | Performed by: FAMILY MEDICINE

## 2024-05-01 PROCEDURE — 83036 HEMOGLOBIN GLYCOSYLATED A1C: CPT

## 2024-05-01 PROCEDURE — 81001 URINALYSIS AUTO W/SCOPE: CPT | Performed by: FAMILY MEDICINE

## 2024-05-01 PROCEDURE — 99214 OFFICE O/P EST MOD 30 MIN: CPT | Mod: PBBFAC,25 | Performed by: FAMILY MEDICINE

## 2024-05-01 PROCEDURE — 87086 URINE CULTURE/COLONY COUNT: CPT | Performed by: FAMILY MEDICINE

## 2024-05-01 PROCEDURE — 36415 COLL VENOUS BLD VENIPUNCTURE: CPT | Performed by: FAMILY MEDICINE

## 2024-05-01 RX ORDER — GLIMEPIRIDE 2 MG/1
2 TABLET ORAL
Qty: 15 TABLET | Refills: 0 | Status: SHIPPED | OUTPATIENT
Start: 2024-05-01 | End: 2024-05-15 | Stop reason: ALTCHOICE

## 2024-05-01 NOTE — PROGRESS NOTES
"  Subjective:       Patient ID: Kaiser Herron Jr. is a 48 y.o. male.    Chief Complaint: Abdominal Pain (Irregular bowel movements) and Constipation    HPI  Kaiser is a 49 y/o male with a history of DM, Class III obesity, mixed hyperlipidemia, GRICEL who presents with complaint new onset constipation since colonoscopy in 3/2023. Prior to the colonoscopy he had daily BM's. He has been on Trulicity since June 2022. His dose was gradually escalated and he achieved  a dose of 3 mg in 12/22 but due to insurance issues and dose availability issues, he was not consistently on it until April 2023. Dose was escalated to 4.5 mg 12/2023. In the last month symptoms have increased and his BM's have been twice weekly with sensation of incomplete emptying. He has been using colace in the last 3 weeks intermittently.He has severe colicky pain Friday night late into Saturday AM with associated diaphoresis and transient shortness of breath. He did have a small BM Sunday with some relief of pain. Symptoms returned Monday but were not as severe, however, he did have N/V on Monday. He has taken Miralax Monday night and Tuesday night with BM today only. He also reports low back pain associated with constipation which he does not feel is MSK. He denies fever, chills, CP, melena, hematochezia. Frequent belching and flatulence.   Review of Systems   Constitutional:  Positive for diaphoresis. Negative for chills and fever.   Gastrointestinal:  Positive for abdominal distention, abdominal pain, change in bowel habit, constipation, nausea, vomiting and reflux. Negative for anal bleeding, blood in stool and fecal incontinence.   Musculoskeletal:  Positive for back pain.          Objective:      Vitals:    05/01/24 1107   BP: 137/89   Pulse: 66   Resp: 18   Temp: 98.2 °F (36.8 °C)   /89 (BP Location: Right arm, Patient Position: Sitting, BP Method: Large (Automatic))   Pulse 66   Temp 98.2 °F (36.8 °C) (Oral)   Resp 18   Ht 5' 10" " (1.778 m)   Wt (!) 151.4 kg (333 lb 12.4 oz)   SpO2 96%   BMI 47.89 kg/m²     Physical Exam  Constitutional:       General: He is not in acute distress.     Appearance: He is not ill-appearing, toxic-appearing or diaphoretic.   Eyes:      Conjunctiva/sclera: Conjunctivae normal.   Cardiovascular:      Rate and Rhythm: Normal rate and regular rhythm.   Pulmonary:      Effort: Pulmonary effort is normal. No respiratory distress.      Breath sounds: No stridor. No wheezing, rhonchi or rales.   Abdominal:      Palpations: There is no mass.      Tenderness: There is abdominal tenderness (LUQ and Left LUQ and intermittently in right upper quadrant). There is guarding. There is no right CVA tenderness, left CVA tenderness or rebound.      Hernia: A hernia (umbilical hernia) is present.   Neurological:      Mental Status: He is alert and oriented to person, place, and time.           05/01/2024  Lipase 18  WBC 9.31 hemoglobin and hematocrit 14.8/43.7 platelet 265 normal differential  CMP normal with the exception of glucose of 129  globulin 4.2 BUN 7.6 creatinine is normal at 0.91 GFR greater than 60  Urinalysis trace protein trace mucus  Assessment/Plan:  RUQ pain  Left lower quadrant pain  Left upper quadrant pain  Umbilical hernia -reducing  Nausea and vomiting, unspecified vomiting type  Constipation possibly secondary to Trulicity  Patient with significant abdominal pain in the left side of the abdomen both upper and lower quadrant and intermittent right upper quadrant pain with associated nausea /vomiting.  Differential diagnosis includes severe constipation versus diverticulitis versus partial obstruction versus pancreatitis versus biliary pathology  Flat and erect of abdomen reviewed by me and significant for constipation /phleboliths with official interpretation pending  Pancreatitis unlikely due to normal lipase  Will discontinue Trulicity at this time as it was known to cause constipation and symptoms began to  increase as dosage increased  CT abdomen/ pelvis to rule out diverticulitis or involvement of umbilical hernia as cause of  or contributor to symptoms   If CT abdomen without concerning pathology we will begin MiraLax 17 g daily and may continue Colace daily with close follow-up  ED precautions given  -     CT Abdomen Pelvis With IV Contrast Routine Oral Contrast; Future; Expected date scheduled for Friday at [facility] imaging  -     Urine Culture High Risk  -     X-Ray Abdomen Flat And Erect-nonspecific bowel gas pattern with constipation  Diabetes mellitus  Due to plan discontinuation of Trulicity, will check A1c to determine if additional medication needs to be added at this time.  Transient diaphoresis and dyspnea   Will obtain ECG for comparison due to increased risk of cardiovascular disease given history of  diabetes and hyperlipidemia  ECG reviewed and demonstrated no acute ST changes -will await cardiology interpretation  RTC 5/15/2024 as scheduled.      Addendum:  Hemoglobin A1c 7.2%  Will add Amaryl 2 mg daily until after CT scan and then will resume metformin as previously prescribed.  May consider changing from Amaryl to  Jardiance at time as a secondary drug.  Will discontinue Trulicity as above

## 2024-05-01 NOTE — LETTER
May 1, 2024      [facility] [address] [place] [address]  Phone: [phone]       Patient: Kaiser Herron   YOB: 1975  Date of Visit: 05/01/2024    To Whom It May Concern:    Best Herron  was at [facility] on 05/01/2024. The patient may return to work on 05/02/2024 with no restrictions. If you have any questions or concerns, or if I can be of further assistance, please do not hesitate to contact me.    Sincerely,    Bailee Saab LPN

## 2024-05-03 ENCOUNTER — HOSPITAL ENCOUNTER (OUTPATIENT)
Dept: RADIOLOGY | Facility: HOSPITAL | Age: 49
Discharge: HOME | End: 2024-05-03
Attending: FAMILY MEDICINE
Payer: COMMERCIAL

## 2024-05-03 DIAGNOSIS — R10.11 RUQ PAIN: ICD-10-CM

## 2024-05-03 LAB — BACTERIA UR CULT: NO GROWTH

## 2024-05-03 PROCEDURE — 76705 ECHO EXAM OF ABDOMEN: CPT | Mod: TC

## 2024-05-15 ENCOUNTER — OFFICE VISIT (OUTPATIENT)
Dept: FAMILY MEDICINE | Facility: CLINIC | Age: 49
End: 2024-05-15
Payer: COMMERCIAL

## 2024-05-15 VITALS
RESPIRATION RATE: 18 BRPM | TEMPERATURE: 98 F | OXYGEN SATURATION: 98 % | DIASTOLIC BLOOD PRESSURE: 76 MMHG | SYSTOLIC BLOOD PRESSURE: 117 MMHG | BODY MASS INDEX: 45.1 KG/M2 | HEART RATE: 68 BPM | WEIGHT: 315 LBS | HEIGHT: 70 IN

## 2024-05-15 DIAGNOSIS — E11.9 TYPE 2 DIABETES MELLITUS WITHOUT COMPLICATION, WITHOUT LONG-TERM CURRENT USE OF INSULIN: Primary | ICD-10-CM

## 2024-05-15 DIAGNOSIS — K40.90 INGUINAL HERNIA OF RIGHT SIDE WITHOUT OBSTRUCTION OR GANGRENE: ICD-10-CM

## 2024-05-15 DIAGNOSIS — K42.9 UMBILICAL HERNIA WITHOUT OBSTRUCTION AND WITHOUT GANGRENE: ICD-10-CM

## 2024-05-15 PROCEDURE — 99215 OFFICE O/P EST HI 40 MIN: CPT | Mod: PBBFAC | Performed by: FAMILY MEDICINE

## 2024-05-15 NOTE — LETTER
May 15, 2024      [facility] [address] [address]  Phone: [phone]       Patient: Kaiser Herron   YOB: 1975  Date of Visit: 05/15/2024    To Whom It May Concern:    Best Herron  was at [facility] on 05/15/2024. The patient may return to work on 05/15/2024 with no restrictions. If you have any questions or concerns, or if I can be of further assistance, please do not hesitate to contact me.    Sincerely,    Bailee Saab LPN

## 2024-05-15 NOTE — PROGRESS NOTES
Subjective:       Patient ID: Kaiser Herron Jr. is a 48 y.o. male.    Chief Complaint: Follow-up (LLQ Pain - Resolved)    GIOVANNY Saab is a 49 y/o male with a history of DM, Class III obesity, mixed hyperlipidemia, GRICEL who presents for follow-up after recent complaint of severe constipation and abdominal (please see note 05/01/2024 for greater detail).  Symptoms had progressively worsened over the last year and especially escalated after his dose of Trulicity was increased to 4.5 mg.  Symptoms peaked  with severe colicky left lower quadrant abdominal pain on the last weekend of April.  He was seen in the office on 05/01/2024 and Trulicity was discontinued.  Due to the severity of his pain CT pelvis and labs ordered with results below.  He currently denies fever, abdominal pain,chills, CP, melena, hematochezia. Frequent belching and flatulence have also improved.     LLQ abdominal pain;Nausea/vomiting; Left lower quadrant pain  PMH   Diabetes mellitus Type 2:   Date                        A1c      11/17/2021             6.4%  06/13/22                 7.4% on metformin only   09/21/2022             6.4% after adding Trulicity  12/21/2022             7.2% Increased Trulicity to 3.0 mg weekly 2/2023 04/26/2023             7.0%  08/23/2023             6.5%  11/22/2023        7.0% increased Trulicity to 4.5 mg weekly at December OV 2/28/2024               7.1%  Current Meds:  Metformin consistently   Diet/Exercise:    Has not been pursuing healthier diet see had previously.    Very active in his job but not counting steps or  aware of how much exercising he is doing.  Doing some limited exercise    Elevated BMI:   BMI      WEIGHT             Date   51.5                            06/28/2021   Maximum   49.71     363 lb           05/18/2022                      361 lb           12/21/2022                359 lb            02/08/2023  48.68     349 lb            04/26/2023  49.00     350 lb             07/05/2023  48.84     340 lb            08/23/2023  48.56     338 lb            11/22/2023  48.21     336 lb            02/28/2024     333 lb 05/01/2024     325 lb 05/15/2024  Diabetic comobidities/complications: no autonomic neuropathy, CVA, heart disease, impotence, nephropathy, peripheral neuropathy, PVD or retinopathy.   Risk factors for coronary artery disease : obesity, sedentary lifestyle, DM male sex. compliant with treatment most of the time. He does not desire a meeting with dietician. He monitors blood glucose at home 1-2 x per week. He monitors urine at home 1-2 x per day. Eye exam is current.   Mixed Hyperlipidemia   Adherent to Crestor  Need repeat lipid panel as he was nonfasting at last assessment  Tinnitus/severe allergic rhinitis:   Seen by ENT -deferred MRI to evaluate a for 8th nerve because he had not met deductible Continues to have tinnitus /right ear pressure discomfort  CONCEPCIÓN: Compliant with  CPAP 6-8 hours per night and wakes feeling well rested.   Recurrent sinusitis; Intermittent KB/Recent OE :  NO Recent complaints  Never did CT sinuses due to cost   MEDS  Rosuvastatin 20 mg daily  Metformin extended release 4 tablets daily with lunch  Cymbalta 60 mg daily  Clonazepam 0.5 mg t.i.d. p.r.n.  Brexpiprazole (Rexulti)  0.5 mg daily  - took for 2 months; discontinued due to cost  Cholecalciferol 5000 units q.a.m.  Triamcinolone CNI ointment 0.1% as needed  HM  Colonoscopy 3/30/2023 single benign polyp with follow up 5 yrs- Patient says 10 per nurse who called with pathology report but he also does not want to ever do it again because of his experience.  Completed colonoscopy with great difficulty because liquid SUPREP didn't work  Review of Systems   Constitutional:  Negative for chills, diaphoresis and fever.   Gastrointestinal:  Negative for abdominal distention, abdominal pain, blood in stool, change in bowel habit, diarrhea, nausea, rectal pain, vomiting, reflux and fecal incontinence.  "  Genitourinary:  Negative for dysuria, frequency, scrotal swelling and testicular pain.            Objective:      Vitals:    05/15/24 0914   BP: 117/76   Pulse: 68   Resp: 18   Temp: 98.4 °F (36.9 °C)   /76 (BP Location: Right arm, Patient Position: Sitting, BP Method: Large (Automatic))   Pulse 68   Temp 98.4 °F (36.9 °C) (Oral)   Resp 18   Ht 5' 10" (1.778 m)   Wt (!) 147.6 kg (325 lb 6.4 oz)   SpO2 98%   BMI 46.69 kg/m²       Physical Exam  Constitutional:       General: He is not in acute distress.     Appearance: He is obese. He is not ill-appearing, toxic-appearing or diaphoretic.   Cardiovascular:      Rate and Rhythm: Normal rate and regular rhythm.   Pulmonary:      Effort: Pulmonary effort is normal. No respiratory distress.      Breath sounds: No stridor. No wheezing, rhonchi or rales.   Abdominal:      Palpations: Abdomen is soft. There is no mass.      Tenderness: There is no abdominal tenderness. There is no right CVA tenderness or guarding.      Hernia: A hernia is present.      Comments: Obese abdomen   Neurological:      Mental Status: He is alert and oriented to person, place, and time.          Latest Reference Range & Units 05/01/24 11:55   WBC 4.50 - 11.50 x10(3)/mcL 9.31   RBC 4.70 - 6.10 x10(6)/mcL 4.89   Hemoglobin 14.0 - 18.0 g/dL 14.8   Hematocrit 42.0 - 52.0 % 43.7   MCV 80.0 - 94.0 fL 89.4   MCH 27.0 - 31.0 pg 30.3   MCHC 33.0 - 36.0 g/dL 33.9   RDW 11.5 - 17.0 % 11.8   Platelet Count 130 - 400 x10(3)/mcL 265   MPV 7.4 - 10.4 fL 8.5   Neut % % 66.8   LYMPH % % 22.3   Mono % % 5.6   Eos % % 4.1   Basophil % % 0.9   Immature Granulocytes % 0.3   Neut # 2.1 - 9.2 x10(3)/mcL 6.22   Lymph # 0.6 - 4.6 x10(3)/mcL 2.08   Mono # 0.1 - 1.3 x10(3)/mcL 0.52   Eos # 0 - 0.9 x10(3)/mcL 0.38   Baso # <=0.2 x10(3)/mcL 0.08   Immature Grans (Abs) 0 - 0.04 x10(3)/mcL 0.03   nRBC % 0.0   Sodium 136 - 145 mmol/L 139   Potassium 3.5 - 5.1 mmol/L 4.1   Chloride 98 - 107 mmol/L 104   CO2 22 - 29 " mmol/L 28   BUN 8.9 - 20.6 mg/dL 7.6 (L)   Creatinine 0.73 - 1.18 mg/dL 0.91   eGFR mls/min/1.73/m2 >60   Glucose 74 - 100 mg/dL 129 (H)   Calcium 8.4 - 10.2 mg/dL 9.8   ALP 40 - 150 unit/L 94   PROTEIN TOTAL 6.4 - 8.3 gm/dL 8.0   Albumin 3.5 - 5.0 g/dL 3.8   Albumin/Globulin Ratio 1.1 - 2.0 ratio 0.9 (L)   BILIRUBIN TOTAL <=1.5 mg/dL 0.6   AST 5 - 34 unit/L 28   ALT 0 - 55 unit/L 43   Lipase <=60 U/L 18   Globulin, Total 2.4 - 3.5 gm/dL 4.2 (H)   Hemoglobin A1C External <=7.0 % 7.2 (H)   Estimated Avg Glucose mg/dL 159.9      Latest Reference Range & Units 05/01/24 12:26   Color, UA Yellow, Light-Yellow, Dark Yellow, [place], Straw  Light-Yellow   Appearance, UA Clear  Clear   Specific Gravity,UA 1.005 - 1.030  1.018   pH, UA 5.0 - 8.5  7.5   Protein, UA Negative  Trace !   Glucose, UA Negative, Normal  Normal   Ketones, UA Negative  Negative   Blood, UA Negative  Negative   NITRITE UA Negative  Negative   Bilirubin (UA) Negative  Negative   Urobilinogen, UA 0.2, 1.0, Normal  Normal   Leukocyte Esterase, UA Negative  Negative   RBC, UA None Seen, 0-2, 3-5, 0-5 /HPF 0-5   WBC, UA None Seen, 0-2, 3-5, 0-5 /HPF 0-5   Bacteria, UA None Seen /HPF None Seen   Squamous Epithelial Cells, UA None Seen /HPF None Seen   Hyaline Casts, UA None Seen /lpf None Seen   Mucous, UA None Seen /LPF Trace !   CULTURE, URINE  Rpt   05/01/2024  Urine culture-growth  05/01/2024  Flattened and rectum of the abdomen-nonobstructive bowel gas; constipation  05/01/2024  ECG  Vent. Rate : 075 BPM     Atrial Rate : 075 BPM      P-R Int : 156 ms          QRS Dur : 090 ms       QT Int : 398 ms       P-R-T Axes : 007 -26 036 degrees      QTc Int : 444 ms     Normal sinus rhythm   Normal ECG   No previous ECGs available   Confirmed by Laila Blanco MD ([id number]) on 5/1/2024 6:07:36 PM   05/03/2024  Ultrasound abdomen-mild hepatomegaly hepatic steatosis  CT abdomen pelvis  IV administration of 100 mL of Omnipaque 300 .  Oral contrast was not  given.  FINDINGS:  A 5 mm nodule is present in the lateral basilar segment of the left lower lobe.  No liver lesion is seen.  The gallbladder is present.  No intra or extrahepatic bile duct dilation is observed. The spleen is mildly enlarged, measuring nearly 13 cm in length.  The pancreas shows no evidence of mass or duct dilation.  The adrenal glands are unremarkable.  There is no enhancing renal mass or hydronephrosis.  The bladder shows no gross wall thickening or intraluminal abnormality.  Coarse calcifications are present in the prostate gland. The small bowel is not dilated.  The terminal ileum and appendix are normal.  Limited evaluation of the colon is unremarkable.  There is no retroperitoneal lymphadenopathy or abdominal aortic aneurysm.  Mild calcified atherosclerosis is present in the aorta.  Disc disease is moderate at L5-S1.  A fat containing umbilical hernia is present.  A fat containing indirect left inguinal hernia is also expected.   Impression:   No acute abnormality detected in the abdomen or pelvis.   Mild splenomegaly.   5 mm nodule in the left lower lobe of the lung.  In a patient at low risk for lung cancer, no further follow-up suggested.   L5-S1 disc disease.   Umbilical and left inguinal hernias.   Electronically signed by:Norm Wong  Date:05/03/2024    Assessment/Plan:  Type 2 diabetes mellitus without complication, without long-term current use of insulin  Discontinuation of Trulicity appears to have resolved symptoms of constipation and abdominal pain  Will attempt trial of Jardiance 10 mg daily  Extensive discussion of potential side effects including Dina's gangrene, yeast infection, urinary tract infection, hypotension, dehydration, falls due to dehydration and low blood pressure.  Explicit instructions given regarding importance of adequate daily hydration and overall increase in water intake compared with baseline  -     empagliflozin (JARDIANCE) 10 mg tablet; Take 1  tablet (10 mg total) by mouth once daily.  Dispense: 30 tablet; Refill: 2    Inguinal hernia of right side without obstruction or gangrene    Patient is currently asymptomatic however given the history of left lower quadrant pain and the presence of umbilical and inguinal hernia, I recommend he establish with a general surgeon for consultation in the event has a problem in the future and to consider surgical intervention.  -     Ambulatory referral/consult to General Surgery; Future; Expected date: 05/22/2024    Umbilical hernia without obstruction and without gangrene  -     Ambulatory referral/consult to General Surgery; Future; Expected date: 05/22/2024         Follow up in about 6 weeks (around 6/26/2024).

## 2024-06-04 DIAGNOSIS — F41.1 GENERALIZED ANXIETY DISORDER: ICD-10-CM

## 2024-06-04 RX ORDER — DULOXETIN HYDROCHLORIDE 60 MG/1
60 CAPSULE, DELAYED RELEASE ORAL
Qty: 90 CAPSULE | Refills: 0 | Status: SHIPPED | OUTPATIENT
Start: 2024-06-04

## 2024-06-20 ENCOUNTER — TELEPHONE (OUTPATIENT)
Dept: FAMILY MEDICINE | Facility: CLINIC | Age: 49
End: 2024-06-20
Payer: COMMERCIAL

## 2024-06-20 DIAGNOSIS — E11.9 TYPE 2 DIABETES MELLITUS WITHOUT COMPLICATION, WITHOUT LONG-TERM CURRENT USE OF INSULIN: ICD-10-CM

## 2024-06-20 NOTE — TELEPHONE ENCOUNTER
Patient's cost via insurance at [facility] for Jardiance is 300 dollars.  Prescription was sent to [facility] as it will be less than 40 dollars for a 90 day supply

## 2024-06-25 ENCOUNTER — OFFICE VISIT (OUTPATIENT)
Dept: FAMILY MEDICINE | Facility: CLINIC | Age: 49
End: 2024-06-25
Payer: COMMERCIAL

## 2024-06-25 VITALS
OXYGEN SATURATION: 98 % | WEIGHT: 315 LBS | RESPIRATION RATE: 18 BRPM | BODY MASS INDEX: 45.1 KG/M2 | HEIGHT: 70 IN | SYSTOLIC BLOOD PRESSURE: 127 MMHG | TEMPERATURE: 98 F | DIASTOLIC BLOOD PRESSURE: 84 MMHG | HEART RATE: 84 BPM

## 2024-06-25 DIAGNOSIS — G47.33 OBSTRUCTIVE SLEEP APNEA SYNDROME: ICD-10-CM

## 2024-06-25 DIAGNOSIS — K76.0 HEPATIC STEATOSIS: ICD-10-CM

## 2024-06-25 DIAGNOSIS — F41.1 GENERALIZED ANXIETY DISORDER: ICD-10-CM

## 2024-06-25 DIAGNOSIS — E11.9 TYPE 2 DIABETES MELLITUS WITHOUT COMPLICATION, WITHOUT LONG-TERM CURRENT USE OF INSULIN: Primary | ICD-10-CM

## 2024-06-25 PROCEDURE — 99214 OFFICE O/P EST MOD 30 MIN: CPT | Mod: PBBFAC | Performed by: FAMILY MEDICINE

## 2024-06-25 RX ORDER — ARIPIPRAZOLE 5 MG/1
5 TABLET ORAL DAILY
COMMUNITY
Start: 2024-06-20
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
## 2024-07-25 ENCOUNTER — DOCUMENTATION ONLY (OUTPATIENT)
Dept: FAMILY MEDICINE | Facility: CLINIC | Age: 49
End: 2024-07-25
Payer: COMMERCIAL

## 2024-08-14 ENCOUNTER — OFFICE VISIT (OUTPATIENT)
Dept: FAMILY MEDICINE | Facility: CLINIC | Age: 49
End: 2024-08-14
Payer: COMMERCIAL

## 2024-08-14 VITALS
BODY MASS INDEX: 45.1 KG/M2 | DIASTOLIC BLOOD PRESSURE: 78 MMHG | TEMPERATURE: 98 F | OXYGEN SATURATION: 96 % | SYSTOLIC BLOOD PRESSURE: 113 MMHG | HEART RATE: 69 BPM | HEIGHT: 70 IN | RESPIRATION RATE: 18 BRPM | WEIGHT: 315 LBS

## 2024-08-14 DIAGNOSIS — E11.9 TYPE 2 DIABETES MELLITUS WITHOUT COMPLICATION, WITHOUT LONG-TERM CURRENT USE OF INSULIN: Primary | ICD-10-CM

## 2024-08-14 DIAGNOSIS — G47.33 OBSTRUCTIVE SLEEP APNEA SYNDROME: ICD-10-CM

## 2024-08-14 DIAGNOSIS — M54.2 NECK PAIN, ACUTE: ICD-10-CM

## 2024-08-14 DIAGNOSIS — M54.50 ACUTE MIDLINE LOW BACK PAIN WITHOUT SCIATICA: ICD-10-CM

## 2024-08-14 DIAGNOSIS — E66.01 MORBID OBESITY WITH BMI OF 45.0-49.9, ADULT: ICD-10-CM

## 2024-08-14 DIAGNOSIS — E78.2 HYPERLIPIDEMIA, MIXED: ICD-10-CM

## 2024-08-14 LAB
ANION GAP SERPL CALC-SCNC: 10 MEQ/L
BUN SERPL-MCNC: 10.6 MG/DL (ref 8.9–20.6)
CALCIUM SERPL-MCNC: 10.4 MG/DL (ref 8.4–10.2)
CHLORIDE SERPL-SCNC: 103 MMOL/L (ref 98–107)
CO2 SERPL-SCNC: 27 MMOL/L (ref 22–29)
CREAT SERPL-MCNC: 1.07 MG/DL (ref 0.73–1.18)
CREAT/UREA NIT SERPL: 10
EST. AVERAGE GLUCOSE BLD GHB EST-MCNC: 168.6 MG/DL
GFR SERPLBLD CREATININE-BSD FMLA CKD-EPI: >60 ML/MIN/1.73/M2
GLUCOSE SERPL-MCNC: 149 MG/DL (ref 74–100)
HBA1C MFR BLD: 7.5 %
POTASSIUM SERPL-SCNC: 3.9 MMOL/L (ref 3.5–5.1)
SODIUM SERPL-SCNC: 140 MMOL/L (ref 136–145)

## 2024-08-14 PROCEDURE — 80048 BASIC METABOLIC PNL TOTAL CA: CPT | Performed by: FAMILY MEDICINE

## 2024-08-14 PROCEDURE — 99214 OFFICE O/P EST MOD 30 MIN: CPT | Mod: PBBFAC | Performed by: FAMILY MEDICINE

## 2024-08-14 PROCEDURE — 83036 HEMOGLOBIN GLYCOSYLATED A1C: CPT | Performed by: FAMILY MEDICINE

## 2024-08-14 PROCEDURE — 36415 COLL VENOUS BLD VENIPUNCTURE: CPT | Performed by: FAMILY MEDICINE
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
## 2024-10-24 ENCOUNTER — TELEPHONE (OUTPATIENT)
Dept: FAMILY MEDICINE | Facility: CLINIC | Age: 49
End: 2024-10-24
Payer: COMMERCIAL

## 2024-10-24 DIAGNOSIS — E11.9 TYPE 2 DIABETES MELLITUS WITHOUT COMPLICATION, WITHOUT LONG-TERM CURRENT USE OF INSULIN: ICD-10-CM

## 2024-11-20 ENCOUNTER — OFFICE VISIT (OUTPATIENT)
Dept: FAMILY MEDICINE | Facility: CLINIC | Age: 49
End: 2024-11-20
Payer: COMMERCIAL

## 2024-11-20 ENCOUNTER — CLINICAL SUPPORT (OUTPATIENT)
Dept: FAMILY MEDICINE | Facility: CLINIC | Age: 49
End: 2024-11-20
Attending: FAMILY MEDICINE
Payer: COMMERCIAL

## 2024-11-20 VITALS
SYSTOLIC BLOOD PRESSURE: 118 MMHG | BODY MASS INDEX: 44.1 KG/M2 | RESPIRATION RATE: 20 BRPM | OXYGEN SATURATION: 95 % | WEIGHT: 315 LBS | HEART RATE: 82 BPM | HEIGHT: 71 IN | DIASTOLIC BLOOD PRESSURE: 80 MMHG | TEMPERATURE: 98 F

## 2024-11-20 DIAGNOSIS — F41.1 GENERALIZED ANXIETY DISORDER: ICD-10-CM

## 2024-11-20 DIAGNOSIS — E11.9 TYPE 2 DIABETES MELLITUS WITHOUT COMPLICATION, WITHOUT LONG-TERM CURRENT USE OF INSULIN: ICD-10-CM

## 2024-11-20 DIAGNOSIS — G47.33 OBSTRUCTIVE SLEEP APNEA SYNDROME: ICD-10-CM

## 2024-11-20 DIAGNOSIS — E11.9 TYPE 2 DIABETES MELLITUS WITHOUT COMPLICATION, WITHOUT LONG-TERM CURRENT USE OF INSULIN: Primary | ICD-10-CM

## 2024-11-20 DIAGNOSIS — E78.2 HYPERLIPIDEMIA, MIXED: ICD-10-CM

## 2024-11-20 DIAGNOSIS — E66.01 MORBID OBESITY WITH BMI OF 45.0-49.9, ADULT: ICD-10-CM

## 2024-11-20 LAB
ALBUMIN SERPL-MCNC: 3.9 G/DL (ref 3.5–5)
ALBUMIN/GLOB SERPL: 1 RATIO (ref 1.1–2)
ALP SERPL-CCNC: 120 UNIT/L (ref 40–150)
ALT SERPL-CCNC: 42 UNIT/L (ref 0–55)
ANION GAP SERPL CALC-SCNC: 12 MEQ/L
AST SERPL-CCNC: 20 UNIT/L (ref 5–34)
BILIRUB SERPL-MCNC: 0.5 MG/DL
BUN SERPL-MCNC: 12.6 MG/DL (ref 8.9–20.6)
CALCIUM SERPL-MCNC: 10.1 MG/DL (ref 8.4–10.2)
CHLORIDE SERPL-SCNC: 104 MMOL/L (ref 98–107)
CHOLEST SERPL-MCNC: 149 MG/DL
CHOLEST/HDLC SERPL: 4 {RATIO} (ref 0–5)
CO2 SERPL-SCNC: 26 MMOL/L (ref 22–29)
CREAT SERPL-MCNC: 0.95 MG/DL (ref 0.72–1.25)
CREAT UR-MCNC: 70.1 MG/DL (ref 63–166)
CREAT/UREA NIT SERPL: 13
EST. AVERAGE GLUCOSE BLD GHB EST-MCNC: 182.9 MG/DL
GFR SERPLBLD CREATININE-BSD FMLA CKD-EPI: >60 ML/MIN/1.73/M2
GLOBULIN SER-MCNC: 3.9 GM/DL (ref 2.4–3.5)
GLUCOSE SERPL-MCNC: 190 MG/DL (ref 74–100)
HBA1C MFR BLD: 8 %
HDLC SERPL-MCNC: 37 MG/DL (ref 35–60)
LDLC SERPL CALC-MCNC: 76 MG/DL (ref 50–140)
MICROALBUMIN UR-MCNC: 11.6 UG/ML
MICROALBUMIN/CREAT RATIO PNL UR: 16.5 MG/GM CR (ref 0–30)
POTASSIUM SERPL-SCNC: 4.3 MMOL/L (ref 3.5–5.1)
PROT SERPL-MCNC: 7.8 GM/DL (ref 6.4–8.3)
SODIUM SERPL-SCNC: 142 MMOL/L (ref 136–145)
TRIGL SERPL-MCNC: 179 MG/DL (ref 34–140)
VLDLC SERPL CALC-MCNC: 36 MG/DL

## 2024-11-20 PROCEDURE — 92228 IMG RTA DETC/MNTR DS PHY/QHP: CPT | Mod: TC,PBBFAC | Performed by: FAMILY MEDICINE

## 2024-11-20 PROCEDURE — 83036 HEMOGLOBIN GLYCOSYLATED A1C: CPT | Performed by: FAMILY MEDICINE

## 2024-11-20 PROCEDURE — 80061 LIPID PANEL: CPT | Performed by: FAMILY MEDICINE

## 2024-11-20 PROCEDURE — 80053 COMPREHEN METABOLIC PANEL: CPT | Performed by: FAMILY MEDICINE

## 2024-11-20 PROCEDURE — 92228 IMG RTA DETC/MNTR DS PHY/QHP: CPT | Mod: PBBFAC

## 2024-11-20 PROCEDURE — 82570 ASSAY OF URINE CREATININE: CPT | Performed by: FAMILY MEDICINE

## 2024-11-20 PROCEDURE — 36415 COLL VENOUS BLD VENIPUNCTURE: CPT | Performed by: FAMILY MEDICINE
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
## 2024-11-21 NOTE — PROGRESS NOTES
Kaiser Hinojosa Germaine Ramirez is a 48 y.o. male here for a diabetic eye screening with non-dilated fundus photos per Dr. Ni.    Patient cooperative?: Yes  Small pupils?: No  Last eye exam: 12/20/2023    For exam results, see Encounter Report.

## 2024-12-04 DIAGNOSIS — E11.9 TYPE 2 DIABETES MELLITUS WITHOUT COMPLICATION, WITHOUT LONG-TERM CURRENT USE OF INSULIN: ICD-10-CM

## 2024-12-05 RX ORDER — METFORMIN HYDROCHLORIDE 500 MG/1
TABLET, EXTENDED RELEASE ORAL
Qty: 360 TABLET | Refills: 1 | Status: SHIPPED | OUTPATIENT
Start: 2024-12-05

## 2025-02-26 ENCOUNTER — OFFICE VISIT (OUTPATIENT)
Dept: FAMILY MEDICINE | Facility: CLINIC | Age: 50
End: 2025-02-26
Payer: COMMERCIAL

## 2025-02-26 VITALS
SYSTOLIC BLOOD PRESSURE: 116 MMHG | BODY MASS INDEX: 43.23 KG/M2 | TEMPERATURE: 98 F | WEIGHT: 308.81 LBS | HEIGHT: 71 IN | HEART RATE: 91 BPM | OXYGEN SATURATION: 95 % | DIASTOLIC BLOOD PRESSURE: 74 MMHG

## 2025-02-26 DIAGNOSIS — G47.33 OBSTRUCTIVE SLEEP APNEA SYNDROME: ICD-10-CM

## 2025-02-26 DIAGNOSIS — R05.8 POST-VIRAL COUGH SYNDROME: ICD-10-CM

## 2025-02-26 DIAGNOSIS — E66.01 MORBID OBESITY WITH BMI OF 45.0-49.9, ADULT: ICD-10-CM

## 2025-02-26 DIAGNOSIS — R06.02 SHORTNESS OF BREATH: ICD-10-CM

## 2025-02-26 DIAGNOSIS — F41.1 GENERALIZED ANXIETY DISORDER: ICD-10-CM

## 2025-02-26 DIAGNOSIS — E11.9 TYPE 2 DIABETES MELLITUS WITHOUT COMPLICATION, WITHOUT LONG-TERM CURRENT USE OF INSULIN: Primary | ICD-10-CM

## 2025-02-26 DIAGNOSIS — E78.2 HYPERLIPIDEMIA, MIXED: ICD-10-CM

## 2025-02-26 PROBLEM — I10 HTN (HYPERTENSION): Status: ACTIVE | Noted: 2025-02-26

## 2025-02-26 PROBLEM — S62.113A CLOSED FRACTURE OF TRIQUETRAL BONE OF WRIST: Status: ACTIVE | Noted: 2025-02-26

## 2025-02-26 PROBLEM — E78.5 DYSLIPIDEMIA: Status: ACTIVE | Noted: 2025-02-26

## 2025-02-26 LAB
ALBUMIN SERPL-MCNC: 4.1 G/DL (ref 3.5–5)
ALBUMIN/GLOB SERPL: 0.9 RATIO (ref 1.1–2)
ALP SERPL-CCNC: 100 UNIT/L (ref 40–150)
ALT SERPL-CCNC: 34 UNIT/L (ref 0–55)
ANION GAP SERPL CALC-SCNC: 6 MEQ/L
AST SERPL-CCNC: 17 UNIT/L (ref 5–34)
BASOPHILS # BLD AUTO: 0.06 X10(3)/MCL
BASOPHILS NFR BLD AUTO: 0.8 %
BILIRUB SERPL-MCNC: 0.5 MG/DL
BUN SERPL-MCNC: 15.7 MG/DL (ref 8.9–20.6)
CALCIUM SERPL-MCNC: 10.2 MG/DL (ref 8.4–10.2)
CHLORIDE SERPL-SCNC: 105 MMOL/L (ref 98–107)
CO2 SERPL-SCNC: 28 MMOL/L (ref 22–29)
CREAT SERPL-MCNC: 0.87 MG/DL (ref 0.72–1.25)
CREAT/UREA NIT SERPL: 18
EOSINOPHIL # BLD AUTO: 0.21 X10(3)/MCL (ref 0–0.9)
EOSINOPHIL NFR BLD AUTO: 2.7 %
ERYTHROCYTE [DISTWIDTH] IN BLOOD BY AUTOMATED COUNT: 12.6 % (ref 11.5–17)
EST. AVERAGE GLUCOSE BLD GHB EST-MCNC: 162.8 MG/DL
GFR SERPLBLD CREATININE-BSD FMLA CKD-EPI: >60 ML/MIN/1.73/M2
GLOBULIN SER-MCNC: 4.4 GM/DL (ref 2.4–3.5)
GLUCOSE SERPL-MCNC: 249 MG/DL (ref 74–100)
HBA1C MFR BLD: 7.3 %
HCT VFR BLD AUTO: 51.4 % (ref 42–52)
HGB BLD-MCNC: 16.8 G/DL (ref 14–18)
IMM GRANULOCYTES # BLD AUTO: 0.02 X10(3)/MCL (ref 0–0.04)
IMM GRANULOCYTES NFR BLD AUTO: 0.3 %
LYMPHOCYTES # BLD AUTO: 1.6 X10(3)/MCL (ref 0.6–4.6)
LYMPHOCYTES NFR BLD AUTO: 20.5 %
MCH RBC QN AUTO: 29.2 PG (ref 27–31)
MCHC RBC AUTO-ENTMCNC: 32.7 G/DL (ref 33–36)
MCV RBC AUTO: 89.4 FL (ref 80–94)
MONOCYTES # BLD AUTO: 0.45 X10(3)/MCL (ref 0.1–1.3)
MONOCYTES NFR BLD AUTO: 5.8 %
NEUTROPHILS # BLD AUTO: 5.45 X10(3)/MCL (ref 2.1–9.2)
NEUTROPHILS NFR BLD AUTO: 69.9 %
NRBC BLD AUTO-RTO: 0 %
PLATELET # BLD AUTO: 260 X10(3)/MCL (ref 130–400)
PMV BLD AUTO: 8.8 FL (ref 7.4–10.4)
POTASSIUM SERPL-SCNC: 4.6 MMOL/L (ref 3.5–5.1)
PROT SERPL-MCNC: 8.5 GM/DL (ref 6.4–8.3)
RBC # BLD AUTO: 5.75 X10(6)/MCL (ref 4.7–6.1)
SODIUM SERPL-SCNC: 139 MMOL/L (ref 136–145)
WBC # BLD AUTO: 7.79 X10(3)/MCL (ref 4.5–11.5)

## 2025-02-26 PROCEDURE — 80053 COMPREHEN METABOLIC PANEL: CPT | Performed by: FAMILY MEDICINE

## 2025-02-26 PROCEDURE — 85025 COMPLETE CBC W/AUTO DIFF WBC: CPT | Performed by: FAMILY MEDICINE

## 2025-02-26 PROCEDURE — 99214 OFFICE O/P EST MOD 30 MIN: CPT | Mod: PBBFAC | Performed by: FAMILY MEDICINE

## 2025-02-26 PROCEDURE — 36415 COLL VENOUS BLD VENIPUNCTURE: CPT | Performed by: FAMILY MEDICINE

## 2025-02-26 PROCEDURE — 83036 HEMOGLOBIN GLYCOSYLATED A1C: CPT | Performed by: FAMILY MEDICINE

## 2025-02-26 RX ORDER — PREDNISONE 10 MG/1
10 TABLET ORAL DAILY
COMMUNITY
Start: 2025-02-06 | End: 2025-02-26 | Stop reason: ALTCHOICE

## 2025-02-26 RX ORDER — DULOXETIN HYDROCHLORIDE 30 MG/1
30 CAPSULE, DELAYED RELEASE ORAL EVERY MORNING
COMMUNITY
Start: 2025-02-05

## 2025-02-26 RX ORDER — CEFADROXIL 500 MG/1
500 CAPSULE ORAL 2 TIMES DAILY
COMMUNITY
Start: 2025-02-06 | End: 2025-02-26 | Stop reason: ALTCHOICE

## 2025-02-26 RX ORDER — DULOXETIN HYDROCHLORIDE 60 MG/1
60 CAPSULE, DELAYED RELEASE ORAL DAILY
Qty: 90 CAPSULE | Refills: 0
Start: 2025-02-26
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
## 2025-03-14 RX ORDER — ROSUVASTATIN CALCIUM 20 MG/1
20 TABLET, COATED ORAL DAILY
Qty: 90 TABLET | Refills: 3 | Status: SHIPPED | OUTPATIENT
Start: 2025-03-14 | End: 2026-03-14

## 2025-05-28 ENCOUNTER — OFFICE VISIT (OUTPATIENT)
Dept: FAMILY MEDICINE | Facility: CLINIC | Age: 50
End: 2025-05-28
Payer: COMMERCIAL

## 2025-05-28 VITALS
WEIGHT: 313.19 LBS | SYSTOLIC BLOOD PRESSURE: 104 MMHG | HEIGHT: 71 IN | HEART RATE: 69 BPM | OXYGEN SATURATION: 95 % | BODY MASS INDEX: 43.85 KG/M2 | TEMPERATURE: 98 F | DIASTOLIC BLOOD PRESSURE: 71 MMHG

## 2025-05-28 DIAGNOSIS — E66.01 MORBID OBESITY WITH BMI OF 45.0-49.9, ADULT: ICD-10-CM

## 2025-05-28 DIAGNOSIS — E11.9 TYPE 2 DIABETES MELLITUS WITHOUT COMPLICATION, WITHOUT LONG-TERM CURRENT USE OF INSULIN: Primary | ICD-10-CM

## 2025-05-28 DIAGNOSIS — G47.33 OBSTRUCTIVE SLEEP APNEA SYNDROME: ICD-10-CM

## 2025-05-28 DIAGNOSIS — E55.9 VITAMIN D DEFICIENCY: ICD-10-CM

## 2025-05-28 DIAGNOSIS — I10 PRIMARY HYPERTENSION: ICD-10-CM

## 2025-05-28 LAB
ALBUMIN SERPL-MCNC: 3.9 G/DL (ref 3.5–5)
ALBUMIN/GLOB SERPL: 0.9 RATIO (ref 1.1–2)
ALP SERPL-CCNC: 114 UNIT/L (ref 40–150)
ALT SERPL-CCNC: 49 UNIT/L (ref 0–55)
ANION GAP SERPL CALC-SCNC: 10 MEQ/L
AST SERPL-CCNC: 22 UNIT/L (ref 11–45)
BILIRUB SERPL-MCNC: 0.8 MG/DL
BUN SERPL-MCNC: 12.2 MG/DL (ref 8.9–20.6)
CALCIUM SERPL-MCNC: 10 MG/DL (ref 8.4–10.2)
CHLORIDE SERPL-SCNC: 102 MMOL/L (ref 98–107)
CO2 SERPL-SCNC: 30 MMOL/L (ref 22–29)
CREAT SERPL-MCNC: 0.88 MG/DL (ref 0.72–1.25)
CREAT/UREA NIT SERPL: 14
EST. AVERAGE GLUCOSE BLD GHB EST-MCNC: 168.6 MG/DL
GFR SERPLBLD CREATININE-BSD FMLA CKD-EPI: >60 ML/MIN/1.73/M2
GLOBULIN SER-MCNC: 4.3 GM/DL (ref 2.4–3.5)
GLUCOSE SERPL-MCNC: 145 MG/DL (ref 74–100)
HBA1C MFR BLD: 7.5 %
POTASSIUM SERPL-SCNC: 4.8 MMOL/L (ref 3.5–5.1)
PROT SERPL-MCNC: 8.2 GM/DL (ref 6.4–8.3)
SODIUM SERPL-SCNC: 142 MMOL/L (ref 136–145)

## 2025-05-28 PROCEDURE — 83036 HEMOGLOBIN GLYCOSYLATED A1C: CPT | Performed by: FAMILY MEDICINE

## 2025-05-28 PROCEDURE — 80053 COMPREHEN METABOLIC PANEL: CPT | Performed by: FAMILY MEDICINE

## 2025-05-28 RX ORDER — METFORMIN HYDROCHLORIDE 500 MG/1
TABLET, EXTENDED RELEASE ORAL
Qty: 360 TABLET | Refills: 1 | Status: SHIPPED | OUTPATIENT
Start: 2025-05-28

## 2025-06-09 ENCOUNTER — DOCUMENTATION ONLY (OUTPATIENT)
Dept: FAMILY MEDICINE | Facility: CLINIC | Age: 50
End: 2025-06-09
Payer: COMMERCIAL